# Patient Record
Sex: FEMALE | Race: BLACK OR AFRICAN AMERICAN | Employment: FULL TIME | URBAN - METROPOLITAN AREA
[De-identification: names, ages, dates, MRNs, and addresses within clinical notes are randomized per-mention and may not be internally consistent; named-entity substitution may affect disease eponyms.]

---

## 2017-04-11 ENCOUNTER — OFFICE VISIT (OUTPATIENT)
Dept: ORTHOPEDIC SURGERY | Age: 52
End: 2017-04-11

## 2017-04-11 VITALS
DIASTOLIC BLOOD PRESSURE: 87 MMHG | SYSTOLIC BLOOD PRESSURE: 149 MMHG | HEART RATE: 70 BPM | TEMPERATURE: 98 F | WEIGHT: 170 LBS | BODY MASS INDEX: 31.09 KG/M2

## 2017-04-11 DIAGNOSIS — M22.2X1 PATELLOFEMORAL PAIN SYNDROME OF BOTH KNEES: Primary | ICD-10-CM

## 2017-04-11 DIAGNOSIS — M25.561 RIGHT KNEE PAIN, UNSPECIFIED CHRONICITY: ICD-10-CM

## 2017-04-11 DIAGNOSIS — M25.562 LEFT KNEE PAIN, UNSPECIFIED CHRONICITY: ICD-10-CM

## 2017-04-11 DIAGNOSIS — M22.2X2 PATELLOFEMORAL PAIN SYNDROME OF BOTH KNEES: Primary | ICD-10-CM

## 2017-04-11 RX ORDER — MELOXICAM 15 MG/1
15 TABLET ORAL
Qty: 30 TAB | Refills: 0 | Status: SHIPPED | OUTPATIENT
Start: 2017-04-11 | End: 2018-09-10

## 2017-04-11 NOTE — PROGRESS NOTES
Ana Lewis  1965   Chief Complaint   Patient presents with    Knee Pain     [place]        HISTORY OF PRESENT ILLNESS  Ana Lewis is a 49 y.o. female who presents today for evaluation of B/L knee pain. She rates her pain 8/10 today. She describes a popping sensation when walking. She recalls she fell in the shower about a year ago and has been having increasing pain since then. Allergies   Allergen Reactions    Codeine Unknown (comments)        Past Medical History:   Diagnosis Date    Hypertension     Low back pain     Other ill-defined conditions     Sleep Apnea      Social History     Social History    Marital status:      Spouse name: N/A    Number of children: N/A    Years of education: N/A     Occupational History    Not on file. Social History Main Topics    Smoking status: Never Smoker    Smokeless tobacco: Not on file    Alcohol use No    Drug use: No    Sexual activity: Not on file     Other Topics Concern    Not on file     Social History Narrative      Past Surgical History:   Procedure Laterality Date    HX GYN      HX HYSTERECTOMY      HX MYOMECTOMY        Family History   Problem Relation Age of Onset    Hypertension Mother     Hypertension Father     Hypertension Brother     Diabetes Brother       Current Outpatient Prescriptions   Medication Sig    PROAIR HFA 90 mcg/actuation inhaler     diclofenac EC (VOLTAREN) 75 mg EC tablet     NIFEdipine ER (ADALAT CC) 30 mg ER tablet     omeprazole (PRILOSEC) 20 mg capsule     traMADol (ULTRAM) 50 mg tablet Take 50 mg by mouth every six (6) hours as needed for Pain. Patient states causes itching    olmesartan (BENICAR) 40 mg tablet Take  by mouth daily.  HYDROcodone-acetaminophen (NORCO) 5-325 mg per tablet     zolpidem (AMBIEN) 5 mg tablet     ibuprofen (MOTRIN) 600 mg tablet Take 1 Tab by mouth every six (6) hours as needed for Pain.     methylPREDNISolone (MEDROL DOSEPACK) 4 mg tablet Per dose pack instructions    COMPOUNDMAX BASE crea 240 g by Does Not Apply route four (4) times daily. Anti-Inflam Meloxicam 0.09% Topirmate 1% Methocarbamol 2%  Lidocaine 2% Prilocaine 2%     No current facility-administered medications for this visit. REVIEW OF SYSTEM   Patient denies: Weight loss, Fever/Chills, HA, Visual changes, Fatigue, Chest pain, SOB, Abdominal pain, N/V/D/C, Blood in stool or urine, Edema. Pertinent positive as above in HPI. All others were negative    PHYSICAL EXAM:   Visit Vitals    /87 (BP 1 Location: Left arm, BP Patient Position: Sitting)    Pulse 70    Temp 98 °F (36.7 °C) (Oral)    Wt 170 lb (77.1 kg)    BMI 31.09 kg/m2     The patient is a well-developed, well-nourished female   in no acute distress. The patient is alert and oriented times three. The patient is alert and oriented times three. Mood and affect are normal.  LYMPHATIC: lymph nodes are not enlarged and are within normal limits  SKIN: normal in color and non tender to palpation. There are no bruises or abrasions noted. NEUROLOGICAL: Motor sensory exam is within normal limits. Reflexes are equal bilaterally. There is normal sensation to pinprick and light touch  MUSCULOSKELETAL:  Examination Left knee Right knee   Skin Intact Intact   Range of motion 0-130 0-130   Effusion - -   Medial joint line tenderness - -   Lateral joint line tenderness - -   Tenderness Pes Bursa - -   Tenderness insertion MCL - -   Tenderness insertion LCL - -   Kavitas - -   Patella crepitus + +   Patella grind + +   Lachman - -   Pivot shift - -   Anterior drawer - -   Posterior drawer - -   Varus stress - -   Valgus stress - -   Neurovascular Intact Intact   Calf Swelling and Tenderness to Palpation - -   Estefanía's Test - -   Hamstring Cord Tightness - -        IMAGING: XR of the B/L knees dated 4/11/17 was reviewed and read: moderate degenerative changes in te patellofemoral joint B/L.       IMPRESSION:      ICD-10-CM ICD-9-CM    1. Patellofemoral pain syndrome of both knees M22.2X1 719.46 meloxicam (MOBIC) 15 mg tablet    M22.2X2     2. Right knee pain, unspecified chronicity M25.561 719.46 AMB POC XRAY, KNEE; 1/2 VIEWS      REFERRAL TO PHYSICAL THERAPY   3. Left knee pain, unspecified chronicity M25.562 719.46 AMB POC XRAY, KNEE; 1/2 VIEWS        PLAN: The patient has been having recurrent pain and catching in the B/L knees for about a year. I will refer her for a short course of physical therapy. She will also be given a prescription for Mobic. I will see her back in 4 weeks for reevaluation. Follow-up Disposition: Not on File    Scribed by Val Bass [facility]) as dictated by Yanet Hagan MD    I, Dr. Yanet Hagan, confirm that all documentation is accurate.     Yanet Hagan M.D.   [facility]

## 2017-04-17 ENCOUNTER — APPOINTMENT (OUTPATIENT)
Dept: PHYSICAL THERAPY | Age: 52
End: 2017-04-17

## 2018-09-10 ENCOUNTER — OFFICE VISIT (OUTPATIENT)
Dept: ORTHOPEDIC SURGERY | Age: 53
End: 2018-09-10

## 2018-09-10 VITALS
OXYGEN SATURATION: 99 % | WEIGHT: 174 LBS | TEMPERATURE: 97.6 F | SYSTOLIC BLOOD PRESSURE: 156 MMHG | HEART RATE: 77 BPM | DIASTOLIC BLOOD PRESSURE: 104 MMHG | BODY MASS INDEX: 31.83 KG/M2

## 2018-09-10 DIAGNOSIS — M47.812 SPONDYLOSIS OF CERVICAL REGION WITHOUT MYELOPATHY OR RADICULOPATHY: Primary | ICD-10-CM

## 2018-09-10 DIAGNOSIS — R20.0 NUMBNESS OF RIGHT HAND: ICD-10-CM

## 2018-09-10 DIAGNOSIS — M51.26 PROTRUSION OF LUMBAR INTERVERTEBRAL DISC: ICD-10-CM

## 2018-09-10 RX ORDER — TOPIRAMATE 25 MG/1
TABLET ORAL
Qty: 30 TAB | Refills: 1 | Status: SHIPPED | OUTPATIENT
Start: 2018-09-10 | End: 2022-09-07 | Stop reason: SINTOL

## 2018-09-10 NOTE — PATIENT INSTRUCTIONS
Learning About Degenerative Disc Disease  What is degenerative disc disease? Degenerative disc disease is not really a disease. It's a term used to describe the normal changes in your spinal discs as you age. Spinal discs are small, spongy discs that separate the bones (vertebrae) that make up the spine. The discs act as shock absorbers for the spine, so it can flex, bend, and twist.  Degenerative disc disease can take place in one or more places along the spine. It most often occurs in the discs in the lower back and the neck. What causes it? As we age, our spinal discs break down, or degenerate. This breakdown causes the symptoms of degenerative disc disease in some people. When the discs break down, they can lose fluid and dry out, and their outer layers can have tiny cracks or tears. This leads to less padding and less space between the bones in the spine. The body reacts to this by making bony growths on the spine called bone spurs. These spurs can press on the spinal nerve roots or spinal cord. This can cause pain and can affect how well the nerves work. What are the symptoms? Many people with degenerative disc disease have no pain. But others have severe pain or other symptoms that limit their activities. Some of the most common symptoms are:  · Pain in the back or neck. Where the pain occurs depends on which discs are affected. · Pain that gets worse when you move, such as bending over, reaching up, or twisting. · Pain that may occur in the rear end (buttocks), arm, or leg if a nerve is pinched. · Numbness or tingling in your arm or leg. How is it diagnosed? A doctor can often diagnose degenerative disc disease while doing a physical exam. If your exam shows no signs of a serious condition, imaging tests (such as an X-ray) aren't likely to help your doctor find the cause of your symptoms.   Sometimes degenerative disc disease is found when an X-ray is taken for another reason, such as an injury or other health problem. But even if the doctor finds degenerative disc disease, that doesn't always mean that you will have symptoms. How is it treated? There are several things you can do at home to manage pain from this problem. · To relieve pain, use ice or heat (whichever feels better) on the affected area. ¨ Put ice or a cold pack on the area for 10 to 20 minutes at a time. Put a thin cloth between the ice and your skin. ¨ Put a warm water bottle, a heating pad set on low, or a warm cloth on your back. Put a thin cloth between the heating pad and your skin. Do not go to sleep with a heating pad on your skin. · Ask your doctor if you can take acetaminophen (such as Tylenol) or nonsteroidal anti-inflammatory drugs, such as ibuprofen or naproxen. Your doctor can prescribe stronger medicines if needed. Be safe with medicines. Read and follow all instructions on the label. · Get some exercise every day. Exercise is one of the best ways to help your back feel better and stay better. It's best to start each exercise slowly. You may notice a little soreness, and that's okay. But if an exercise makes your pain worse, stop doing it. Here are things you can try:  ¨ Walking. It's the simplest and maybe the best activity for your back. It gets your blood moving and helps your muscles stay strong. ¨ Exercises that gently stretch and strengthen your stomach, back, and leg muscles. The stronger those muscles are, the better they're able to protect your back. If you have constant or severe pain in your back or spine, you may need other treatments, such as physical therapy. In some cases, your doctor may suggest surgery. Follow-up care is a key part of your treatment and safety. Be sure to make and go to all appointments, and call your doctor if you are having problems. It's also a good idea to know your test results and keep a list of the medicines you take. Where can you learn more?   Go to http://adriel-janelle.info/. Enter G964 in the search box to learn more about \"Learning About Degenerative Disc Disease. \"  Current as of: November 29, 2017  Content Version: 11.7  © 9582-9174 [employer]. Care instructions adapted under license by [employer] (which disclaims liability or warranty for this information). If you have questions about a medical condition or this instruction, always ask your healthcare professional. [place] [address] any warranty or liability for your use of this information.

## 2018-09-10 NOTE — PROGRESS NOTES
Verbal order entered per Dr. Shavon Sanchez as documented on blue sheet:  -Physical Therapy neck pain [facility]  -Topamax 25mg 1 tab po qhs disp#30 rf1

## 2018-09-10 NOTE — MR AVS SNAPSHOT
Saman Gregory 
 
 
 [address] [facility] [address] [facility] [address] 
[phone] Patient: Janae Gates MRN: [id number] :1965 Visit Information Date & Time Provider Department Dept. Phone Encounter #  
 9/10/2018  1:10 PM Francoise Chaney MD [facility], [address] [facility] [phone] [id number] Follow-up Instructions Return in about 6 weeks (around 10/22/2018). Upcoming Health Maintenance Date Due DTaP/Tdap/Td series (1 - Tdap) 1987 PAP AKA CERVICAL CYTOLOGY 1987 BREAST CANCER SCRN MAMMOGRAM 2016 FOBT Q 1 YEAR AGE 50-75 2016 Influenza Age 5 to Adult 2018 Allergies as of 9/10/2018  Review Complete On: 9/10/2018 By: Kelly Tavera Severity Noted Reaction Type Reactions Codeine    Unknown (comments) Current Immunizations  Never Reviewed No immunizations on file. Not reviewed this visit You Were Diagnosed With   
  
 Codes Comments Spondylosis of cervical region without myelopathy or radiculopathy    -  Primary ICD-10-CM: Y99.626 ICD-9-CM: 721.0 Numbness of right hand     ICD-10-CM: R20.0 ICD-9-CM: 782.0 Protrusion of lumbar intervertebral disc     ICD-10-CM: M51.26 
ICD-9-CM: 722.10 Vitals BP Pulse Temp Weight(growth percentile) SpO2 BMI  
 (!) 156/104 77 97.6 °F (36.4 °C) (Tympanic) 174 lb (78.9 kg) 99% 31.83 kg/m2 OB Status Smoking Status Hysterectomy Never Smoker BMI and BSA Data Body Mass Index Body Surface Area  
 31.83 kg/m 2 1.86 m 2 Preferred Pharmacy Pharmacy Name Phone [address] [facility], [address] Your Updated Medication List  
  
   
This list is accurate as of 9/10/18  2:17 PM.  Always use your most recent med list.  
  
  
  
  
 BENICAR 40 mg tablet Generic drug:  olmesartan Take  by mouth daily. ibuprofen 600 mg tablet Commonly known as:  MOTRIN Take 1 Tab by mouth every six (6) hours as needed for Pain. NIFEdipine ER 30 mg ER tablet Commonly known as:  ADALAT CC  
  
 omeprazole 20 mg capsule Commonly known as:  PRILOSEC PROAIR HFA 90 mcg/actuation inhaler Generic drug:  albuterol  
  
 topiramate 25 mg tablet Commonly known as:  TOPAMAX  
1 tab po qhs  
  
  
  
  
Prescriptions Sent to Pharmacy Refills  
 topiramate (TOPAMAX) 25 mg tablet 1 Si tab po qhs  
 Class: Normal  
 Pharmacy: [id number], [address] [facility] #: [phone] We Performed the Following AMB POC XRAY, SPINE, LUMBOSACRAL; 4+ [id number] CPT(R)] REFERRAL TO PHYSICAL THERAPY [UMV80 Custom] Follow-up Instructions Return in about 6 weeks (around 10/22/2018). Referral Information Referral ID Referred By Referred To  
  
 [id number] [address], [address] [place] [address] Phone: [phone] Fax: [phone] Visits Status Start Date End Date 1 New Request 9/10/18 9/10/19 If your referral has a status of pending review or denied, additional information will be sent to support the outcome of this decision. Patient Instructions Learning About Degenerative Disc Disease What is degenerative disc disease? Degenerative disc disease is not really a disease. It's a term used to describe the normal changes in your spinal discs as you age. Spinal discs are small, spongy discs that separate the bones (vertebrae) that make up the spine. The discs act as shock absorbers for the spine, so it can flex, bend, and twist. 
Degenerative disc disease can take place in one or more places along the spine. It most often occurs in the discs in the lower back and the neck. What causes it? As we age, our spinal discs break down, or degenerate.  This breakdown causes the symptoms of degenerative disc disease in some people. When the discs break down, they can lose fluid and dry out, and their outer layers can have tiny cracks or tears. This leads to less padding and less space between the bones in the spine. The body reacts to this by making bony growths on the spine called bone spurs. These spurs can press on the spinal nerve roots or spinal cord. This can cause pain and can affect how well the nerves work. What are the symptoms? Many people with degenerative disc disease have no pain. But others have severe pain or other symptoms that limit their activities. Some of the most common symptoms are: 
· Pain in the back or neck. Where the pain occurs depends on which discs are affected. · Pain that gets worse when you move, such as bending over, reaching up, or twisting. · Pain that may occur in the rear end (buttocks), arm, or leg if a nerve is pinched. · Numbness or tingling in your arm or leg. How is it diagnosed? A doctor can often diagnose degenerative disc disease while doing a physical exam. If your exam shows no signs of a serious condition, imaging tests (such as an X-ray) aren't likely to help your doctor find the cause of your symptoms. Sometimes degenerative disc disease is found when an X-ray is taken for another reason, such as an injury or other health problem. But even if the doctor finds degenerative disc disease, that doesn't always mean that you will have symptoms. How is it treated? There are several things you can do at home to manage pain from this problem. · To relieve pain, use ice or heat (whichever feels better) on the affected area. ¨ Put ice or a cold pack on the area for 10 to 20 minutes at a time. Put a thin cloth between the ice and your skin. ¨ Put a warm water bottle, a heating pad set on low, or a warm cloth on your back. Put a thin cloth between the heating pad and your skin.  Do not go to sleep with a heating pad on your skin. · Ask your doctor if you can take acetaminophen (such as Tylenol) or nonsteroidal anti-inflammatory drugs, such as ibuprofen or naproxen. Your doctor can prescribe stronger medicines if needed. Be safe with medicines. Read and follow all instructions on the label. · Get some exercise every day. Exercise is one of the best ways to help your back feel better and stay better. It's best to start each exercise slowly. You may notice a little soreness, and that's okay. But if an exercise makes your pain worse, stop doing it. Here are things you can try: ¨ Walking. It's the simplest and maybe the best activity for your back. It gets your blood moving and helps your muscles stay strong. ¨ Exercises that gently stretch and strengthen your stomach, back, and leg muscles. The stronger those muscles are, the better they're able to protect your back. If you have constant or severe pain in your back or spine, you may need other treatments, such as physical therapy. In some cases, your doctor may suggest surgery. Follow-up care is a key part of your treatment and safety. Be sure to make and go to all appointments, and call your doctor if you are having problems. It's also a good idea to know your test results and keep a list of the medicines you take. Where can you learn more? Go to http://adriel-janelle.info/. Enter H861 in the search box to learn more about \"Learning About Degenerative Disc Disease. \" Current as of: November 29, 2017 Content Version: 11.7 © 2757-6764 [employer]. Care instructions adapted under license by [employer] (which disclaims liability or warranty for this information). If you have questions about a medical condition or this instruction, always ask your healthcare professional. [place] [address] any warranty or liability for your use of this information. Introducing [facility]! [employer] introduces ???? patient portal. Now you can access parts of your medical record, email your doctor's office, and request medication refills online. 1. In your internet browser, go to [email or web address] 2. Click on the First Time User? Click Here link in the Sign In box. You will see the New Member Sign Up page. 3. Enter your ???? Access Code exactly as it appears below. You will not need to use this code after youve completed the sign-up process. If you do not sign up before the expiration date, you must request a new code. · ???? Access Code: [email or web address] Expires: 12/9/2018  2:17 PM 
 
4. Enter the last four digits of your Social Security Number (xxxx) and Date of Birth (mm/dd/yyyy) as indicated and click Submit. You will be taken to the next sign-up page. 5. Create a ???? ID. This will be your ???? login ID and cannot be changed, so think of one that is secure and easy to remember. 6. Create a ???? password. You can change your password at any time. 7. Enter your Password Reset Question and Answer. This can be used at a later time if you forget your password. 8. Enter your e-mail address. You will receive e-mail notification when new information is available in [address] 9. Click Sign Up. You can now view and download portions of your medical record. 10. Click the Download Summary menu link to download a portable copy of your medical information. If you have questions, please visit the Frequently Asked Questions section of the ???? website. Remember, ???? is NOT to be used for urgent needs. For medical emergencies, dial 911. Now available from your iPhone and Android! Please provide this summary of care documentation to your next provider. Your primary care clinician is listed as Dave Gloria. If you have any questions after today's visit, please call [phone].
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
## 2020-10-29 ENCOUNTER — OFFICE VISIT (OUTPATIENT)
Dept: ORTHOPEDIC SURGERY | Age: 55
End: 2020-10-29
Payer: COMMERCIAL

## 2020-10-29 VITALS
DIASTOLIC BLOOD PRESSURE: 90 MMHG | BODY MASS INDEX: 31.28 KG/M2 | HEART RATE: 79 BPM | SYSTOLIC BLOOD PRESSURE: 168 MMHG | TEMPERATURE: 97.9 F | WEIGHT: 171 LBS | RESPIRATION RATE: 16 BRPM

## 2020-10-29 DIAGNOSIS — M54.2 NECK PAIN: Primary | ICD-10-CM

## 2020-10-29 DIAGNOSIS — M62.838 NECK MUSCLE SPASM: ICD-10-CM

## 2020-10-29 PROCEDURE — 99213 OFFICE O/P EST LOW 20 MIN: CPT | Performed by: NURSE PRACTITIONER

## 2020-10-29 RX ORDER — DICLOFENAC SODIUM 75 MG/1
75 TABLET, DELAYED RELEASE ORAL
Qty: 60 TAB | Refills: 1 | Status: SHIPPED | OUTPATIENT
Start: 2020-10-29 | End: 2022-09-07

## 2020-10-29 RX ORDER — METHOCARBAMOL 500 MG/1
500 TABLET, FILM COATED ORAL
Qty: 60 TAB | Refills: 1 | Status: SHIPPED | OUTPATIENT
Start: 2020-10-29 | End: 2022-09-07

## 2020-10-29 NOTE — PROGRESS NOTES
Yifan Benavidez presents today for   Chief Complaint   Patient presents with    Neck Pain     fu       Is someone accompanying this pt? NO    Is the patient using any DME equipment during OV? NO    Depression Screening:  3 most recent PHQ Screens 10/29/2020   Little interest or pleasure in doing things Not at all   Feeling down, depressed, irritable, or hopeless Not at all   Total Score PHQ 2 0         Coordination of Care:  1. Have you been to the ER, urgent care clinic since your last visit? NO  Hospitalized since your last visit? NO    2. Have you seen or consulted any other health care providers outside of the [address] [place] [address] since your last visit? NO Include any pap smears or colon screening.  NO    Last  Checked 10/29/2020

## 2020-10-29 NOTE — PROGRESS NOTES
[address]  [address], [address]  [place], [address] [place] [address] Street  Phone: [phone]  Fax: [phone]    Kitty Razo  : 1965  PCP: Mireya Kendrick MD    PROGRESS NOTE    HISTORY OF PRESENT ILLNESS:  Chief Complaint   Patient presents with    Neck Pain     fu     Frankey Putnam is a 48 y.o.  female with history of neck and back pain. She last saw Dr. Christina Sebastian  2 years ago. She was referred to C PT and a C MRI was discussed after PT. She was given a trial if Topamax and was to continue tylenol. Today, she states she is having cervical pain and head pain and pain in her bilateral shoulders she is describing as heavy. She is a headache daily. She sleeps with a rolled pillow under her neck. She has seen a massage therapist. Her back continues to hurt intermittently but her neck is really bothering her the most.  She has no arm pain. She has taken motrin. She has been taking 4 Tylenol and 4 Advil four times a day. Denies bladder/bowel dysfunction, saddle paresthesia, weakness, gait disturbance, or other neurological deficit. Pt at this time desires to  continue with current care/proceed with medication evaluation. Location of pain: low back, neck  Does pain radiate into extremities: LLE sciatica intermittently  Does patient have weakness: Pt denies dropping things. Pt denies saddle paresthesias. Medications pt is on: Ibuprofen 600mg PRN some relief. Pt denies recent ED visits or hospitalizations. Denies persistent fevers, chills, weight changes, neurogenic bowel or bladder symptoms.      Treatments patient has tried:  Physical therapy:Yes, low back  Non-opioid medications: Yes  Spinal injections: No  Spinal surgery- No.      PMHx of gastric ulcer. 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 XRAY Cervical spine 8/3/2018  Result Impression   Impression: Mild reversal of cervical curvature and spondylolisthesis as described.          XRAY lumbar spine  5/2016  Result Impression   Impression: Degenerative change right sacroiliac joint.      2018  Lumbar spine xray films reviewed:  1) normal alignment  2) degenerative changes at L5-S1  3) no instability           MRI lumbar spine 2/2015  Result Impression   Impression:    1.  Small nonimpinging left posterior disc protrusion L4/5.    2.  Circumferential epidural fat with mild canal stenosis L5/S1. 3.  No acute osseous findings. ASSESSMENT  48 y.o. female with neck and back pain. Diagnoses and all orders for this visit:    1. Neck pain  -     REFERRAL TO PHYSICAL THERAPY    2. Neck muscle spasm  -     REFERRAL TO PHYSICAL THERAPY    Other orders  -     diclofenac EC (VOLTAREN) 75 mg EC tablet; Take 1 Tab by mouth two (2) times daily as needed for Pain. With food  -     methocarbamoL (Robaxin) 500 mg tablet; Take 1 Tab by mouth three (3) times daily as needed for Muscle Spasm(s). Indications: muscle spasm         IMPRESSION/PLAN    1) Pt was given information on neck exercises. 2) referral to PT, eval for dry needling  3) diclofenac, robaxin  4) can consider C MRI after conservative treatment  5) Ms. Anthony Warner has a reminder for a \"due or due soon\" health maintenance. I have asked that she contact her primary care provider, Angeles Block MD, for follow-up on this health maintenance. 6) We have informed patient to notify us for immediate appointment if he has any worsening neurogical symptoms or if an emergency situation presents, then call 911  7) Pt will follow-up in 8 weeks for conservative treatment FU. Risks and benefits of ongoing therapy have been reviewed with the patient.  is appropriate. No pain behaviors. Denies thoughts of harming self or others. Pt has a good risk to benefit ratio which allows the pt to function in a home environment without side effects.          PAST MEDICAL HISTORY  Past Medical History:   Diagnosis Date    Gastric ulcer     approx 2013    Hypertension     Low back pain     Other ill-defined conditions(799.89)     Sleep Apnea        MEDICATIONS  Current Outpatient Medications   Medication Sig Dispense Refill    diclofenac EC (VOLTAREN) 75 mg EC tablet Take 1 Tab by mouth two (2) times daily as needed for Pain. With food 60 Tab 1    methocarbamoL (Robaxin) 500 mg tablet Take 1 Tab by mouth three (3) times daily as needed for Muscle Spasm(s). Indications: muscle spasm 60 Tab 1    olmesartan (BENICAR) 40 mg tablet Take  by mouth daily.         topiramate (TOPAMAX) 25 mg tablet 1 tab po qhs 30 Tab 1    PROAIR HFA 90 mcg/actuation inhaler   0    NIFEdipine ER (ADALAT CC) 30 mg ER tablet   0    omeprazole (PRILOSEC) 20 mg capsule   0       ALLERGIES  Allergies   Allergen Reactions    Codeine Unknown (comments)       SOCIAL HISTORY    Social History     Socioeconomic History    Marital status:      Spouse name: Not on file    Number of children: Not on file    Years of education: Not on file    Highest education level: Not on file   Occupational History    Not on file   Social Needs    Financial resource strain: Not on file    Food insecurity     Worry: Not on file     Inability: Not on file    Transportation needs     Medical: Not on file     Non-medical: Not on file   Tobacco Use    Smoking status: Never Smoker    Smokeless tobacco: Never Used   Substance and Sexual Activity    Alcohol use: No    Drug use: No    Sexual activity: Not on file   Lifestyle    Physical activity     Days per week: Not on file     Minutes per session: Not on file    Stress: Not on file   Relationships    Social connections     Talks on phone: Not on file     Gets together: Not on file     Attends Congregational service: Not on file     Active member of club or organization: Not on file     Attends meetings of clubs or organizations: Not on file     Relationship status: Not on file    Intimate partner violence     Fear of current or ex partner: Not on file     Emotionally abused: Not on file     Physically abused: Not on file     Forced sexual activity: Not on file   Other Topics Concern    Not on file   Social History Narrative    Not on file       SUBJECTIVE      Pain Scale: 8/10    Pain Assessment  10/29/2020   Location of Pain Neck   Location Modifiers -   Severity of Pain 8   Quality of Pain (No Data)   Quality of Pain Comment pressure   Duration of Pain Persistent   Frequency of Pain Constant   Aggravating Factors -   Aggravating Factors Comment -   Limiting Behavior Some   Relieving Factors (No Data)   Relieving Factors Comment massage while doing       Accompanied by self. REVIEW OF SYSTEMS  ROS    Constitutional: Negative for fever, chills, or weight change. Respiratory: Negative for cough or shortness of breath. Cardiovascular: Negative for chest pain or palpitations. Gastrointestinal: Negative for acid reflux, change in bowel habits, or constipation. Genitourinary: Negative for incontinence, dysuria and flank pain. Musculoskeletal: Positive for neck and back pain. Skin: Negative for rash. Neurological: Negative for headaches, dizziness, or numbness. Endo/Heme/Allergies: Negative . Psychiatric/Behavioral: Negative. PHYSICAL EXAMINATION  Visit Vitals  BP (!) 168/90   Pulse 79   Temp 97.9 °F (36.6 °C) (Temporal)   Resp 16   Wt 171 lb (77.6 kg)   BMI 31.28 kg/m²       Constitutional: Well developed,  well nourished,  awake, alert, and in no acute distress. Neurological:  Sensation to light touch is intact. Psychiatric: Affect and mood are appropriate. Integumentary: No rashes or abrasions noted on exposed areas,  warm, dry and intact. Cardiovascular/Peripheral Vascular:  No peripheral edema is noted. Lymphatic:  No evidence of lymphedema. No cervical lymphadenopathy. SPINE/MUSCULOSKELETAL EXAM    Cervical spine:  Neck is midline. Normal muscle tone. No focal atrophy is noted.   Tenderness to palpation B/L occipital notch, B/L upper trapezii, cervical paraspinals. Negative Spurling's sign. Negative Tinel's sign. Negative Lilly's sign.      Sensation grossly intact to light touch.      Lumbar spine:  No rash, ecchymosis, or gross obliquity. No fasciculations. No focal atrophy is noted. Tenderness to palpation L4-5. No tenderness to palpation at the sciatic notch. SI joints non-tender. Trochanters non tender. Sensation grossly intact to light touch.        MOTOR:       Biceps  Triceps Deltoids Wrist Ext Wrist Flex Hand Intrin   Right +4/5 +4/5 +4/5 +4/5 +4/5 4/5   Left +4/5 +4/5 +4/5 +4/5 +4/5 4/5           Hip Flex  Quads Hamstrings Ankle DF EHL Ankle PF   Right +4/5 +4/5 +4/5 +4/5 +4/5 +4/5   Left +4/5 +4/5 +4/5 +4/5 +4/5 +4/5      Straight Leg raise negative bilaterally. Ambulation without assistive device. FWB.       Mounika Horan NP 82

## 2020-11-05 ENCOUNTER — HOSPITAL ENCOUNTER (OUTPATIENT)
Dept: PHYSICAL THERAPY | Age: 55
Discharge: HOME | End: 2020-11-05
Payer: COMMERCIAL

## 2020-11-05 PROCEDURE — 97162 PT EVAL MOD COMPLEX 30 MIN: CPT

## 2020-11-05 PROCEDURE — 97140 MANUAL THERAPY 1/> REGIONS: CPT

## 2020-11-05 PROCEDURE — 97110 THERAPEUTIC EXERCISES: CPT

## 2020-11-05 PROCEDURE — 97535 SELF CARE MNGMENT TRAINING: CPT

## 2020-11-05 NOTE — PROGRESS NOTES
[facility]  [address]  [place], [address]  [phone] fax    Plan of Care/ Statement of Necessity for Physical Therapy Services    Patient name: Petros Jiménez Start of Care: 2020   Referral source: Kvaya Tavera NP : 1965    Medical Diagnosis: Cervicalgia [M54.2]  Payor: [facility] / Plan: [facility] PPO / Product Type: PPO /  Onset Date:2020    Treatment Diagnosis: neck pain   Prior Hospitalization: see medical history Provider#: [id number]   Medications: Verified on Patient summary List    Comorbidities: back pain, headaches, high blood pressure   Prior Level of Function: manages ADLs without neck pain     The Plan of Care and following information is based on the information from the initial evaluation. Assessment/ key information: Pt is a 60-year-old female with c/o neck and B shoulder pain with cervicogenic headaches beginning 2 months ago, progressively worsening. Pain begins in the suboccipital region, into the cervical paraspinals, scalenes, levator scaps, and B upper traps. Denies any neurological symptoms or UE pain. Pain increases with cervical ROM in all directions. Pain described as throbbing. Pt notes one recent experience with a deep tissue massage to her neck in which she experienced vertigo for 9 days following with 1 fall. 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 Current impairments include reduced cervical ROM, decreased flexibility, decreased ROM, decreased strength, daily headaches, and decreased ease of  ADLs and self care. Increased myofascial restrictions and muscle tensions noted to the B scalenes and upper traps (left > right). Pt would benefit from dry needling.     Patient will benefit from skilled PT services to modify and progress therapeutic interventions, address functional mobility deficits, address ROM deficits, address strength deficits, analyze and address soft tissue restrictions, analyze and cue movement patterns, analyze and modify body mechanics/ergonomics, assess and modify postural abnormalities, address imbalance/dizziness and instruct in home and community integration to attain remaining goals. Evaluation Complexity History MEDIUM  Complexity : 1-2 comorbidities / personal factors will impact the outcome/ POC ; Examination MEDIUM Complexity : 3 Standardized tests and measures addressing body structure, function, activity limitation and / or participation in recreation  ;Presentation MEDIUM Complexity : Evolving with changing characteristics  ; Clinical Decision Making MEDIUM Complexity : FOTO score of 26-74  Overall Complexity Rating: MEDIUM  Problem List: pain affecting function, decrease ROM, decrease strength, decrease ADL/ functional abilitiies, decrease activity tolerance and decrease flexibility/ joint mobility   Treatment Plan may include any combination of the following: Therapeutic exercise, Therapeutic activities, Neuromuscular re-education, Physical agent/modality, Manual therapy, Patient education, Self Care training and Functional mobility training  Patient / Family readiness to learn indicated by: asking questions, trying to perform skills and interest  Persons(s) to be included in education: patient (P)  Barriers to Learning/Limitations: None  Patient Goal (s): relieve pain  Patient Self Reported Health Status: good  Rehabilitation Potential: good    Short Term Goals: To be accomplished in 2 weeks:  1. Pt will report daily compliance with HEP to maximize therapeutic success. 2. Pt will report decreased pain post-therapy, indicating good prognosis for therapeutic carryover. Long Term Goals: To be accomplished in 4 weeks:  1. Pt will improve FOTO to 52, demonstrating improved functional capacity for ADLs.   2. Pt will improve cervical flexion and extension ROM to 40 degrees or better to improve ease of self care. 3. Pt will improve cervical lateral flexion B to 40 degrees to improve ease of self care. 4. Pt will improve B cervical rotation rotation to 45 degrees or better with minimal to no pain to improve ease of driving. 5. Pt will improve scapular strength MMT to 4+/5 or better to improve ease of ADLs. 6. Pt will report 75% improvement in symptoms with minimal to no headaches to improve QOL. Frequency / Duration: Patient to be seen 2 times per week for 4 weeks. Patient/ Caregiver education and instruction: Diagnosis, prognosis, self care, activity modification and exercises   [x]  Plan of care has been reviewed with ASA Graff, PT 11/5/2020 9:04 AM    ________________________________________________________________________    I certify that the above Therapy Services are being furnished while the patient is under my care. I agree with the treatment plan and certify that this therapy is necessary.     Physician's Signature:____________Date:_________TIME:________    ** Signature, Date and Time must be completed for valid certification **    Please sign and return to [facility] [address]  [address] [facility] [address]  [place], [address]  [phone] fax

## 2020-11-05 NOTE — PROGRESS NOTES
PT DAILY TREATMENT NOTE 11    Patient Name: Shannan Lafleur  Date:2020  : 1965  [x]  Patient  Verified  Payor: BLUE CROSS / Plan: [facility] PPO / Product Type: PPO /    In time:745  Out time:843  Total Treatment Time (min): 62  Visit #: 1 of 8    Medicare/BCBS Only   Total Timed Codes (min):  18 1:1 Treatment Time:  48       Treatment Area: Cervicalgia [M54.2]    SUBJECTIVE  Pain Level (0-10 scale): 10  Any medication changes, allergies to medications, adverse drug reactions, diagnosis change, or new procedure performed?: [x] No    [] Yes (see summary sheet for update)  Subjective functional status/changes:   [] No changes reported  See POC    OBJECTIVE     18 min [x]Eval                  []Re-Eval     15 min Therapeutic Exercise:  [] See flow sheet : HEP education and performance   Rationale: increase ROM and increase strength to improve the patients ability to perform ADLs. 15 min Self Care/Home management:  []  See flow sheet : Discussion of relevant anatomy and pathology as it relates to self care. Discussed techniques for upper trap relaxation throughout the day. Educated pt regarding process for dry needling intervention and importance of daily HEP compliance. Rationale: decrease pain  to improve the patients ability to improve ease of self care. 10 min Manual Therapy:  Pt supine with LE wedge -- SOR, STM to cervical paraspinals, scalenes, levator scaps, upper traps; GR2 PA oscillations to c/s   The manual therapy interventions were performed at a separate and distinct time from the therapeutic activities interventions. Rationale: decrease pain, increase ROM and increase tissue extensibility to improve ease of ADLs.         With   [] TE   [] TA   [] neuro   [] other: Patient Education: [x] Review HEP    [] Progressed/Changed HEP based on:   [] positioning   [] body mechanics   [] transfers   [] heat/ice application    [] other:      Other Objective/Functional Measures: see POC     Pain Level (0-10 scale) post treatment: 8    ASSESSMENT/Changes in Function: Pt is a 60-year-old female with c/o neck and B shoulder pain with cervicogenic headaches beginning 2 months ago, progressively worsening. Pain begins in the suboccipital region, into the cervical paraspinals, scalenes, levator scaps, and B upper traps. Denies any neurological symptoms or UE pain. Pain increases with cervical ROM in all directions. Pain described as throbbing. Pt notes one recent experience with a deep tissue massage to her neck in which she experienced vertigo for 9 days following with 1 fall. 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 Current impairments include reduced cervical ROM, decreased flexibility, decreased ROM, decreased strength, daily headaches, and decreased ease of  ADLs and self care. Increased myofascial restrictions and muscle tensions noted to the B scalenes and upper traps (left > right). Pt would benefit from dry needling. Patient will benefit from skilled PT services to modify and progress therapeutic interventions, address functional mobility deficits, address ROM deficits, address strength deficits, analyze and address soft tissue restrictions, analyze and cue movement patterns, analyze and modify body mechanics/ergonomics, assess and modify postural abnormalities, address imbalance/dizziness and instruct in home and community integration to attain remaining goals. [x]  See Plan of Care  []  See progress note/recertification  []  See Discharge Summary         Progress towards goals / Updated goals:  Short Term Goals: To be accomplished in 2 weeks:  1. Pt will report daily compliance with HEP to maximize therapeutic success. Eval: HEP assigned  2. Pt will report decreased pain post-therapy, indicating good prognosis for therapeutic carryover. Eval: decreased pain post-manual  Long Term Goals:  To be accomplished in 4 weeks:  1. Pt will improve FOTO to 52, demonstrating improved functional capacity for ADLs. Eval: 28  2. Pt will improve cervical flexion and extension ROM to 40 degrees or better to improve ease of self care. Eval: 15 degrees flexion with pain, 20 degrees ext with pain  3. Pt will improve cervical lateral flexion B to 40 degrees to improve ease of self care. Eval: 30 degrees B with pain  4. Pt will improve B cervical rotation rotation to 45 degrees or better with minimal to no pain to improve ease of driving. Eval: 20 degrees to right, 35 degrees to left with pain B  5. Pt will improve scapular strength MMT to 4+/5 or better to improve ease of ADLs. Eval: grossly 4/5 B, 4-/5 ER B  6. Pt will report 75% improvement in symptoms with minimal to no headaches to improve QOL.     Eval: 0%, daily headaches    PLAN  []  Upgrade activities as tolerated     [x]  Continue plan of care  []  Update interventions per flow sheet       []  Discharge due to:_  []  Other:_      Leigh Ma, PT 11/5/2020  8:46 AM    Future Appointments   Date Time Provider Diane Gomez   12/30/2020  8:15 AM Cisco Avelar NP [facility] BS AMB

## 2020-11-20 NOTE — PROGRESS NOTES
[facility]  [address]  [place], [address]  [phone] fax    Physical Therapy Discharge Summary  Patient name: Maryanne Rashid Start of Care: 2020   Referral source: Tashi Albarran NP : 1965                Medical Diagnosis: Cervicalgia [M54.2]  Payor: BLUE [place] / Plan: [facility] PPO / Product Type: PPO /  Onset Date:2020                Treatment Diagnosis: neck pain   Prior Hospitalization: see medical history Provider#: [id number]   Medications: Verified on Patient summary List    Comorbidities: back pain, headaches, high blood pressure   Prior Level of Function: manages ADLs without neck pain    Visits from Start of Care: 1    Missed Visits: 3  Reporting Period : 2020 to 2020      Summary of Care:  Short Term Goals: To be accomplished in 2 weeks:  1. Pt will report daily compliance with HEP to maximize therapeutic success. Eval: HEP assigned  2. Pt will report decreased pain post-therapy, indicating good prognosis for therapeutic carryover. Eval: decreased pain post-manual  Long Term Goals: To be accomplished in 4 weeks:  1. Pt will improve FOTO to 52, demonstrating improved functional capacity for ADLs. Eval: 28  2. Pt will improve cervical flexion and extension ROM to 40 degrees or better to improve ease of self care. Eval: 15 degrees flexion with pain, 20 degrees ext with pain  3. Pt will improve cervical lateral flexion B to 40 degrees to improve ease of self care. Eval: 30 degrees B with pain  4. Pt will improve B cervical rotation rotation to 45 degrees or better with minimal to no pain to improve ease of driving. Eval: 20 degrees to right, 35 degrees to left with pain B  5. Pt will improve scapular strength MMT to 4+/5 or better to improve ease of ADLs. Eval: grossly 4/5 B, 4-/5 ER B  6.  Pt will report 75% improvement in symptoms with minimal to no headaches to improve QOL. Eval: 0%, daily headaches      ASSESSMENT/RECOMMENDATIONS: The pt has not attended any f/u sessions since evaluation. She reports currently quarantined secondary to COVID close contact. Pt also reports BP issues and workup ongoing.  Decided to D/C from PT at this time    [x]Discontinue therapy:       [x]Patient is non-compliant or has abdicated          Harmony Leonard DPT ,CMTPT 11/20/2020 11:33 AM

## 2020-11-23 ENCOUNTER — APPOINTMENT (OUTPATIENT)
Dept: PHYSICAL THERAPY | Age: 55
End: 2020-11-23
Payer: COMMERCIAL

## 2020-11-25 ENCOUNTER — APPOINTMENT (OUTPATIENT)
Dept: PHYSICAL THERAPY | Age: 55
End: 2020-11-25
Payer: COMMERCIAL

## 2020-11-30 ENCOUNTER — APPOINTMENT (OUTPATIENT)
Dept: PHYSICAL THERAPY | Age: 55
End: 2020-11-30
Payer: COMMERCIAL

## 2020-12-02 ENCOUNTER — APPOINTMENT (OUTPATIENT)
Dept: PHYSICAL THERAPY | Age: 55
End: 2020-12-02

## 2022-03-19 PROBLEM — R20.0 NUMBNESS OF RIGHT HAND: Status: ACTIVE | Noted: 2018-09-10

## 2022-03-20 PROBLEM — M47.812 SPONDYLOSIS OF CERVICAL REGION WITHOUT MYELOPATHY OR RADICULOPATHY: Status: ACTIVE | Noted: 2018-09-10

## 2022-09-07 ENCOUNTER — OFFICE VISIT (OUTPATIENT)
Dept: ORTHOPEDIC SURGERY | Age: 57
End: 2022-09-07
Payer: COMMERCIAL

## 2022-09-07 VITALS
WEIGHT: 166 LBS | TEMPERATURE: 97.5 F | RESPIRATION RATE: 18 BRPM | HEIGHT: 62 IN | OXYGEN SATURATION: 100 % | BODY MASS INDEX: 30.55 KG/M2 | HEART RATE: 78 BPM

## 2022-09-07 DIAGNOSIS — M54.10 BRACHIAL NEURITIS OF LEFT UPPER EXTREMITY: Primary | ICD-10-CM

## 2022-09-07 DIAGNOSIS — M89.9 LESION OF BONE OF THORACIC SPINE: Primary | ICD-10-CM

## 2022-09-07 DIAGNOSIS — M54.10 BRACHIAL NEURITIS OF LEFT UPPER EXTREMITY: ICD-10-CM

## 2022-09-07 DIAGNOSIS — G89.29 CHRONIC BILATERAL LOW BACK PAIN WITHOUT SCIATICA: ICD-10-CM

## 2022-09-07 DIAGNOSIS — M54.2 NECK PAIN: ICD-10-CM

## 2022-09-07 DIAGNOSIS — M54.50 CHRONIC BILATERAL LOW BACK PAIN WITHOUT SCIATICA: ICD-10-CM

## 2022-09-07 PROCEDURE — 72110 X-RAY EXAM L-2 SPINE 4/>VWS: CPT | Performed by: PHYSICAL MEDICINE & REHABILITATION

## 2022-09-07 PROCEDURE — 72040 X-RAY EXAM NECK SPINE 2-3 VW: CPT | Performed by: PHYSICAL MEDICINE & REHABILITATION

## 2022-09-07 PROCEDURE — 99214 OFFICE O/P EST MOD 30 MIN: CPT | Performed by: PHYSICAL MEDICINE & REHABILITATION

## 2022-09-07 RX ORDER — OLMESARTAN MEDOXOMIL AND HYDROCHLOROTHIAZIDE 40/25 40; 25 MG/1; MG/1
1 TABLET ORAL DAILY
COMMUNITY
Start: 2022-08-18

## 2022-09-07 RX ORDER — AMLODIPINE BESYLATE 5 MG/1
5 TABLET ORAL DAILY
COMMUNITY
Start: 2022-08-18

## 2022-09-07 RX ORDER — PREGABALIN 50 MG/1
50 CAPSULE ORAL 2 TIMES DAILY
Qty: 60 CAPSULE | Refills: 1 | Status: SHIPPED | OUTPATIENT
Start: 2022-09-07 | End: 2022-10-19 | Stop reason: SDUPTHER

## 2022-09-07 NOTE — PROGRESS NOTES
Omer Marvin presents today for   Chief Complaint   Patient presents with    Neck Pain       Is someone accompanying this pt? no    Is the patient using any DME equipment during OV? no    Depression Screening:  3 most recent PHQ Screens 10/29/2020   Little interest or pleasure in doing things Not at all   Feeling down, depressed, irritable, or hopeless Not at all   Total Score PHQ 2 0       Learning Assessment:  No flowsheet data found. Abuse Screening:  No flowsheet data found. Fall Risk  No flowsheet data found. OPIOID RISK TOOL  No flowsheet data found. Coordination of Care:  1. Have you been to the ER, urgent care clinic since your last visit? no  Hospitalized since your last visit? no    2. Have you seen or consulted any other health care providers outside of the [address] [place] [address] since your last visit? no Include any pap smears or colon screening.  no

## 2022-09-07 NOTE — PROGRESS NOTES
[address]  [address], [address]  [place], [address] [place] [address] Street  Phone: [phone]  Fax: [phone]        Capri Mabry  : 1965  PCP: Sheron Nelson MD    PROGRESS NOTE      ASSESSMENT AND PLAN    Diagnoses and all orders for this visit:    1. Brachial neuritis of left upper extremity  -     MRI CERV SPINE WO CONT; Future  -     pregabalin (Lyrica) 50 mg capsule; Take 1 Capsule by mouth two (2) times a day. Max Daily Amount: 100 mg. Month 1: one po qhs x 1 week, then increase to one po bid thereafter    2. Neck pain  -     AMB POC XRAY, SPINE, CERVICAL; 2 OR 3  -     MRI CERV SPINE WO CONT; Future    3. Chronic bilateral low back pain without sciatica  -     AMB POC XRAY, SPINE, LUMBOSACRAL; 4+      Riya Navarro is a 65 y.o. female with 2 months of persistent left upper extremity pain after COVID infection. Failed NSAIDs. .   C MRI for 2 months LUE pain and paresthesia post COVID, failed oral steroids/NSAIDs, currently in PT  Trial of Lyrica 50 mg. Take 1 po QHS x one week then increase to 1 po BID thereafter  Avoid overhead lifting or reaching. Follow-up and Dispositions    Return in about 4 weeks (around 10/5/2022) for MRI/CT fu. HISTORY OF PRESENT ILLNESS      Riya Navarro is a 65 y.o. female presents for follow up of neck pain. LV 10/2020 with NP Mary, referred to PT, trial of Robaxin, trial of Voltaren 75 mg BID. She reports constant neck pain radiating into her LUE x 2 months. Her symptoms started after she getting COVID. Pt has numbness and tingling in her LUE to the fingertips. Denies hx of CTS. Pt has constant headaches. She denies chest pain/shortness of breath. She also complains of cramping in her calves and feet, worse at night. Pt has failed Voltaren, Baclofen, and Prednisone. Denies side effects. Pain Assessment  2022   Location of Pain Neck; Shoulder;Arm   Location Modifiers Left   Severity of Pain 8 Quality of Pain Dull;Aching; Sharp   Quality of Pain Comment -   Duration of Pain Persistent   Frequency of Pain Constant   Aggravating Factors Bending;Stretching;Straightening;Exercise;Kneeling;Squatting;Standing;Walking;Stairs; Other (Comment)   Aggravating Factors Comment first thing in the morning movement   Limiting Behavior Yes   Relieving Factors Heat   Relieving Factors Comment -   Result of Injury No         Investigations:   C XR AP/lat 2V 9/7/2022 (I personally reviewed these images): NSS, degenerative changes C4-5  L XR AP/lat/flex/ext 4V 9/7/2022 (I personally reviewed these images): early degenerative changes L4 to sacrum, no instability  L MRI 2015: small disc protrusion left L4-5, mild stenosis L5-S1  Spine surgery consult: unknown    Treatments:  Physical therapy: 2/2022 for neck, shoulder, and low back, 10/2020  Spinal injections: no  Spinal surgery- no  Beneficial medications: unknown  Failed medications: Voltaren, Baclofen, Prednisone    Work Status: employed at [employer]  Pertinent PMHx:  HTN, gastric ulcer, COVID 7/2022.       PHYSICAL EXAMINATION    Visit Vitals  Pulse 78   Temp 97.5 °F (36.4 °C) (Temporal)   Resp 18   Ht 5' 2\" (1.575 m)   Wt 166 lb (75.3 kg)   SpO2 100% Comment: RA   BMI 30.36 kg/m²       Left shoulder abduction limited to 90 degrees  Tender medial and lateral epicondyle left elbow  Pain with left wrist ROM  Negative Tinel's at wrist, positive left elbow  DTRs absent UE and LE  TTP L3-4 left greater than right  LE strength intact  SLR negative  RUE strength intact  LUE pain inhibited deltoids, triceps, biceps intact  Intrinsics intact                Written by Randa Martinez, as dictated by Dominic Roblero MD.

## 2022-09-07 NOTE — LETTER
9/8/2022    Patient: Ayala Mathias   YOB: 1965   Date of Visit: 9/7/2022     Stefani Bell MD  [facility] [address]  Via Fax: [phone]    Dear Stefani Bell MD,      Thank you for referring Ms. Olive Law to [address] [place] [address] for evaluation. My notes for this consultation are attached. If you have questions, please do not hesitate to call me. I look forward to following your patient along with you.       Sincerely,    Fernanda Vincent MD

## 2022-09-08 ENCOUNTER — HOSPITAL ENCOUNTER (OUTPATIENT)
Dept: PHYSICAL THERAPY | Age: 57
Discharge: HOME | End: 2022-09-08
Payer: COMMERCIAL

## 2022-09-08 PROCEDURE — 97162 PT EVAL MOD COMPLEX 30 MIN: CPT

## 2022-09-08 NOTE — PROGRESS NOTES
[facility]  [address] [facility] [address]  [place], [address]  [phone] fax    Plan of Care/ Statement of Necessity for Physical Therapy Services    Patient name: Katarina Shelby Start of Care: 2022   Referral source: Alvin Li MD : 1965    Medical Diagnosis: Cervical radicular pain [M54.12]  Payor: BLUE CROSS / Plan: [facility] [place] [place] / Product Type: PPO /  Onset Date:2022    Treatment Diagnosis: neck pain   Prior Hospitalization: see medical history Provider#: [id number]   Medications: Verified on Patient summary List    Comorbidities: independent with ADLs   Prior Level of Function: unable to comb hair with left arm or hold phone with left arm     The Plan of Care and following information is based on the information from the initial evaluation. Assessment/ key information: Pt is a 65 y.o. female who presents with c/o chronic neck pain ongoing for multiple years with a recent exacerbation after being diagnosed with Covid in 2022. Pt reports having increased numbness and tingling sensation down left arm. Functional deficits include: decreased activity tolerance with the left arm and pain with cervical AROM. Upon exam, Pt exhibited decreased cervical AROM secondary to pain, moderate tenderness to palpation over bilateral UT, LS, and SCM and decreased muscle strength. Pt would benefit from skilled PT to address above deficits to improve Pt's function and ability to return to ease of performing daily activities. Evaluation Complexity History MEDIUM  Complexity : 1-2 comorbidities / personal factors will impact the outcome/ POC ; Examination MEDIUM Complexity : 3 Standardized tests and measures addressing body structure, function, activity limitation and / or participation in recreation  ;Presentation MEDIUM Complexity : Evolving with changing characteristics  ; Clinical Decision Making MEDIUM Complexity : FOTO score of 26-74  Overall Complexity Rating: MEDIUM  Problem List: pain affecting function, decrease ROM, decrease strength, decrease ADL/ functional abilitiies, decrease activity tolerance, decrease flexibility/ joint mobility, and decrease transfer abilities   Treatment Plan may include any combination of the following: Therapeutic exercise, Therapeutic activities, Neuromuscular re-education, Physical agent/modality, Manual therapy, Patient education, and Self Care training  Patient / Family readiness to learn indicated by: asking questions, trying to perform skills, and interest  Persons(s) to be included in education: patient (P)  Barriers to Learning/Limitations: None  Patient Goal (s): I want to eliminate as much pain as possible and find out what it is  Patient Self Reported Health Status: good  Rehabilitation Potential: good    Short Term Goals: To be accomplished in 1 week  - Goal: Pt to be compliant with initial HEP to improve ease of performing household chores and duties  Status at last note/certification: Established and reviewed with Pt  Long Term Goals: To be accomplished in 10 treatments  - Goal: Pt to report < 5/10 pain at worst to increase ease with ADLs. Status at last note/certification: 27/74 pain at worst  - Goal: Pt to report at least 75 % improvement in overall symptoms to improve quality of life and ease of performing household chores. Status at last note/certification: N/A  - Goal: Pt to demonstrate 4/5 gross UE MMT to increase ease of maintaining scapular stability and movement. Status at last note/certification: Lower Trap: 3/5 bilateral              Rhomboids:3/5 bilateral              Middle Trap: 3/5 bilateral  - Goal: Pt to report FOTO score of at least 53 pts to demonstrate improved function and quality of life. Status at last note/certification: FOTO 32 pts     Frequency / Duration: Patient to be seen 2 times per week for 5 weeks.     Patient/ Caregiver education and instruction: Diagnosis, prognosis, self care, activity modification, and exercises   [x]  Plan of care has been reviewed with ASA German, PT 9/8/2022 11:04 AM    ________________________________________________________________________    I certify that the above Therapy Services are being furnished while the patient is under my care. I agree with the treatment plan and certify that this therapy is necessary.     [de-identified] Signature:____________Date:_________TIME:________     Madhuri Chaudhry MD  ** Signature, Date and Time must be completed for valid certification **    Please sign and return to [facility] [address] [place] [address] & [facility]  [address] [place] [address], [facility] [address]  [phone] fax

## 2022-09-08 NOTE — PROGRESS NOTES
PT DAILY TREATMENT NOTE/CERVICAL [id number]    Patient Name: Abdelrahman Slade  Date:2022  : 1965  [x]  Patient  Verified  Payor: BLUE CROSS / Plan: [facility] PPO / Product Type: PPO /    In time:10:22am  Out time:10:58am  Total Treatment Time (min): 36  Visit #: 1 of 10    Medicare/BCBS Only   Total Timed Codes (min):  11 1:1 Treatment Time:  36     Treatment Area: Cervical radicular pain [M54.12]    SUBJECTIVE  Pain Level (0-10 scale): current: 8; best: 6 (when awake); worst: 10/10  []constant []intermittent []improving []worsening []no change since onset    Any medication changes, allergies to medications, adverse drug reactions, diagnosis change, or new procedure performed?: [x] No    [] Yes (see summary sheet for update)  Subjective functional status/changes:     PLOF: Independent with ADLs  Limitations to PLOF: unable to comb hair, noticed weakness when holding phone for a while it starts to tremble  Mechanism of Injury: Ongoing neck pain for years. Had Covid in  and I noticed it started to feel worse. Covid symptoms for two weeks and I have residual arm numbness and tingling since recovering from Covid. Right handed. 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 \"    OBJECTIVE/EXAMINATION  Mobility: independent  Self Care: independent      25 min [x]Eval                  []Re-Eval       11 min Therapeutic Exercise:  [] See flow sheet :   Rationale: increase ROM and increase strength to improve the patients ability to perform daily activities with ease        With   [x] TE   [] TA   [] neuro   [] other: Patient Education: [x] Review HEP    [] Progressed/Changed HEP based on:   [] positioning   [] body mechanics   [] transfers   [] heat/ice application    [] other:      Other Objective/Functional Measures:     Physical Therapy Evaluation Cervical Spine     SUBJECTIVE    Headaches: Do you have headaches? [x] Yes   [] No  How often do you get headaches? All the time  How long does the headache last? Roughly 1 hour  What aggravates it? What relieves it? Medication doesn't seem to work  Does the headache coincide with any other symptoms (visual disturbances, light sensitivity)? Where is the headache? Front of head  Does it change locations?   Other:    OBJECTIVE  Posture: [] WNL  Head Position: forward head  Shoulder/Scapular Position: rounded shoulders    Cervical Retraction: [x] WNL    [] Abnormal:    Active Movements: [] N/A   [] Too acute   [] Other:  ROM % AROM % PROM Comments:pain, area   Forward flexion 75     Extension 50     SB right 100  Pain/pulling   SB left  100     Rotation right 100     Rotation left        Palpation:  [] Min  [x] Mod  [] Severe    Location: UT, LS, insertion bilateral SCM, muscle belly left SCM  [] Min  [] Mod  [] Severe    Location:  [] Min  [] Mod  [] Severe    Location:    Neuro Screen (myotome/dematome/felexes): [] WNL  Myotome Level Muscle Test Myotome Level Muscle Test   C5 Shoulder Adduction - Deltoid C8 Finger Flexors   C6 Wrist Extension T1 Finger Abduction - Interossei   C7 Elbow Extension     Comments:  Upper Limb Tension Tests: [] N/A       Ulnar: [] R    [] L    [] +    [x] -       Median: [] R    [] L    [] +    [x] -       Radial: [] R    [] L    [] +    [x] -    Special Tests:  Cervical:        Vertebral Artery:  [] R    [] L    [] +    [] -       Alar Ligament: [] R    [] L    [] +    [] -       Transverse Lig: [] R    [] L    [] +    [] -       Spurling's:  [] R    [] L    [] +    [] - Distraction:  [] R    [] L    [] +    [x] -       Compression: [] R    [] L    [] +    [x] -      Muscle Flexibility: [] N/A   Scalenes: [] WNL    [] Tight    [] R    [] L   Upper Trap: [] WNL    [] Tight    [] R    [] L   Levator: [] WNL    [] Tight    [] R    [] L   Pect. Minor: [] WNL    [] Tight    [] R    [] L    Global Muscular Weakness: [] N/A   Lower Trap: 3/5 bilateral   Rhomboids:3/5 bilateral   Middle Trap: 3/5 bilateral     Other tests/comments:     Pain Level (0-10 scale) post treatment: 7 neck and shoulders    ASSESSMENT/Changes in Function: See POC    Patient will continue to benefit from skilled PT services to modify and progress therapeutic interventions, address functional mobility deficits, address ROM deficits, address strength deficits, analyze and address soft tissue restrictions, analyze and cue movement patterns, analyze and modify body mechanics/ergonomics, assess and modify postural abnormalities, address imbalance/dizziness, and instruct in home and community integration to attain remaining goals.      [x]  See Plan of Care  []  See progress note/recertification  []  See Discharge Summary         Progress towards goals / Updated goals:  See POC     PLAN  []  Upgrade activities as tolerated     [x]  Continue plan of care  []  Update interventions per flow sheet       []  Discharge due to:_  []  Other:_      Francine Radford, PT 9/8/2022  10:12 AM

## 2022-09-13 ENCOUNTER — APPOINTMENT (OUTPATIENT)
Dept: PHYSICAL THERAPY | Age: 57
End: 2022-09-13
Payer: COMMERCIAL

## 2022-09-16 ENCOUNTER — APPOINTMENT (OUTPATIENT)
Dept: PHYSICAL THERAPY | Age: 57
End: 2022-09-16
Payer: COMMERCIAL

## 2022-09-20 ENCOUNTER — TELEPHONE (OUTPATIENT)
Dept: PHYSICAL THERAPY | Age: 57
End: 2022-09-20

## 2022-09-20 NOTE — PROGRESS NOTES
[facility]  [address]  [place], [address]  [phone] fax    Physical Therapy Discharge Summary  Patient name: Matt Meneses Start of Care: 2022   Referral source: Lajune Dubin, MD : 1965   Medical/Treatment Diagnosis: Cervical radicular pain [M54.12]  Payor: BLUE CROSS / Plan: Missy Arceo [id number] PPO / Product Type: PPO /  Onset Date:2022     Prior Hospitalization: see medical history Provider#: [id number]   Medications: Verified on Patient Summary List    Comorbidities: independent with ADLs  Prior Level of Function:unable to comb hair with left arm or hold phone with left arm  Visits from Start of Care: 1    Missed Visits: 3  Reporting Period : 2022 to 2022      Summary of Care:    Short Term Goals: To be accomplished in 1 week  1: Pt to be compliant with initial HEP to improve ease of performing household chores and duties  Status at last note/certification: Established and reviewed with Pt  Discharge: Unable to reassess, pt request D/C after missing 3 appointments. Long Term Goals: To be accomplished in 10 treatments  1: Pt to report < 5/10 pain at worst to increase ease with ADLs. Status at last note/certification: 66/48 pain at worst  Discharge: Unable to reassess, pt request D/C after missing 3 appointments. 2: Pt to report at least 75 % improvement in overall symptoms to improve quality of life and ease of performing household chores. Status at last note/certification: N/A  Discharge: Unable to reassess, pt request D/C after missing 3 appointments. 3: Pt to demonstrate 4/5 gross UE MMT to increase ease of maintaining scapular stability and movement. Status at last note/certification: Lower Trap: 3/5 bilateral              Rhomboids:3/5 bilateral              Middle Trap: 3/5 bilateral  Discharge: Unable to reassess, pt request D/C after missing 3 appointments.   4: Pt to report FOTO score of at least 53 pts to demonstrate improved function and quality of life. Status at last note/certification: FOTO 32 pts  Discharge: Unable to reassess, pt request D/C after missing 3 appointments. ASSESSMENT/RECOMMENDATIONS:  Initial Evaluation on 9/8/2022. Scheduled 2 F/U appointments. R/S 1 and later no showed 2. Attempted to contact pt via home phone regarding no show, no answer and voicemail box full.  Pt later called back requesting D/C from PT.  [x]Discontinue therapy: []Patient has reached or is progressing toward set goals      [x]Patient is non-compliant or has abdicated      []Due to lack of appreciable progress towards set goals    Libby Carter, PTA 9/20/2022 5:31 PM

## 2022-09-25 ENCOUNTER — HOSPITAL ENCOUNTER (OUTPATIENT)
Age: 57
Discharge: HOME | End: 2022-09-25
Attending: PHYSICAL MEDICINE & REHABILITATION
Payer: COMMERCIAL

## 2022-09-25 PROCEDURE — 72141 MRI NECK SPINE W/O DYE: CPT

## 2022-10-19 ENCOUNTER — OFFICE VISIT (OUTPATIENT)
Dept: ORTHOPEDIC SURGERY | Age: 57
End: 2022-10-19
Payer: COMMERCIAL

## 2022-10-19 VITALS
BODY MASS INDEX: 28.34 KG/M2 | TEMPERATURE: 97.4 F | HEART RATE: 84 BPM | OXYGEN SATURATION: 99 % | WEIGHT: 154 LBS | HEIGHT: 62 IN

## 2022-10-19 DIAGNOSIS — M54.10 BRACHIAL NEURITIS OF LEFT UPPER EXTREMITY: ICD-10-CM

## 2022-10-19 DIAGNOSIS — M50.20 PROTRUSION OF CERVICAL INTERVERTEBRAL DISC: ICD-10-CM

## 2022-10-19 DIAGNOSIS — M47.816 LUMBAR SPONDYLOSIS: Primary | ICD-10-CM

## 2022-10-19 DIAGNOSIS — M70.62 TROCHANTERIC BURSITIS, LEFT HIP: ICD-10-CM

## 2022-10-19 DIAGNOSIS — M89.9 LESION OF BONE OF THORACIC SPINE: ICD-10-CM

## 2022-10-19 PROCEDURE — 99214 OFFICE O/P EST MOD 30 MIN: CPT | Performed by: PHYSICAL MEDICINE & REHABILITATION

## 2022-10-19 RX ORDER — SEMAGLUTIDE 1.34 MG/ML
INJECTION, SOLUTION SUBCUTANEOUS
COMMUNITY
Start: 2022-09-23

## 2022-10-19 RX ORDER — ERGOCALCIFEROL 1.25 MG/1
CAPSULE ORAL
COMMUNITY
Start: 2022-09-21

## 2022-10-19 RX ORDER — PREGABALIN 50 MG/1
CAPSULE ORAL
Qty: 60 CAPSULE | Refills: 2 | Status: SHIPPED | OUTPATIENT
Start: 2022-10-19

## 2022-10-19 RX ORDER — ATORVASTATIN CALCIUM 20 MG/1
TABLET, FILM COATED ORAL
COMMUNITY
Start: 2022-09-21

## 2022-10-19 NOTE — PROGRESS NOTES
[address], [address]  [place]  Phone: [phone]  Fax: [phone]        Esha Vance  : 1965  PCP: Alex Paige MD    PROGRESS NOTE      ASSESSMENT AND PLAN    Diagnoses and all orders for this visit:    1. Lumbar spondylosis  -     pregabalin (Lyrica) 50 mg capsule; One-two po qpm    2. Trochanteric bursitis, left hip  -     pregabalin (Lyrica) 50 mg capsule; One-two po qpm    3. Lesion of bone of thoracic spine    4. Protrusion of cervical intervertebral disc    5. Brachial neuritis of left upper extremity  -     pregabalin (Lyrica) 50 mg capsule; One-two po qpm      Javier Duarte is a 65 y.o. female with long COVID, thankfully her left upper extremity radicular pain has improved significantly with physical therapy and Lyrica. Incidental finding on cervical MRI was concerning for atypical hemangioma, she is due to have her 3-month follow-up MRI with and without contrast in December. Her main symptom today is that of axial low back pain. Hip tightness noted bilaterally on exam today. RF Lyrica. Patient may take 1-2 po QHS. Given instructions on hip flexor exercises. Perform as tolerated. Advised to continue HEP as tolerated. Follow-up and Dispositions    Return in about 3 months (around 2023) for medication management. HISTORY OF PRESENT ILLNESS      Javier Duarte is a 65 y.o. female presents for follow up of back pain. LV trial of Lyrica 50 mg BID. C MRI reviewed with patient. Atypical thoracic hemangioma noted. Reports that her daughter had a similar finding earlier today. Her neck pain is manageable. She states the numbness and tingling LUE has improved. She is now doing home exercise. Pt also reports low back and B/L hip pain. Back > neck pain. Her hip pain is increased with ROM. She has trouble getting out of the tub with her left hip. Denies sciatica.     Pt takes Lyrica 50 mg QHS PRN. She is unable to tolerate during the day. She notes benefit with PT for her neck pain. She is compliant with her HEP. Going to [facility] to surprise her granddaughter for her birthday around Thanksgiving. Pain Assessment  10/19/2022   Location of Pain Neck;Back   Location Modifiers -   Severity of Pain 2   Quality of Pain Other (Comment)   Quality of Pain Comment like something has cut me accross the back   Duration of Pain Persistent   Frequency of Pain Intermittent   Aggravating Factors Other (Comment)   Aggravating Factors Comment the pain just happens. Limiting Behavior Yes   Relieving Factors Other (Comment)   Relieving Factors Comment deep breathing   Result of Injury No         Investigations:   C MRI 9/2022: disc protrusion C5-6, T5 lesion recommend follow up 3-6 months (scheduled for 12/2022)    L XR AP/lat/flex/ext 4V 9/7/2022 (I personally reviewed these images): early degenerative changes L4 to sacrum, no instability  L MRI 2015: small disc protrusion left L4-5, mild stenosis L5-S1  Spine surgery consult: unknown     Treatments:  Physical therapy: 9/2022 for neck - benefit, 2/2022 for neck, shoulder, and low back, 10/2020  Spinal injections: no  Spinal surgery- no  Beneficial medications: Lyrica QHS - unable to tolerate during the day  Failed medications: Voltaren, Baclofen, Prednisone     Work Status: employed at [employer]  Pertinent PMHx:  HTN, gastric ulcer, COVID 7/2022, long COVID symptoms including loss of taste, left upper extremity paresthesias (resolved).     PHYSICAL EXAMINATION    Visit Vitals  Pulse 84   Temp 97.4 °F (36.3 °C) (Temporal)   Ht 5' 2\" (1.575 m)   Wt 154 lb (69.9 kg)   SpO2 99%   BMI 28.17 kg/m²     UE strength intact  TTP midline mid thoracic, left L3-4  Tender left trochanteric bursa  Positive ASYA maneuver L > R  LE strength intact  SLR negative  LUE strength intact  Negative Lilly's               Written by Bryanna Bearden, as dictated by Pio Lynch MD.

## 2022-10-19 NOTE — PROGRESS NOTES
Renee Lynn presents today for   Chief Complaint   Patient presents with    Neck Pain    Back Pain       Is someone accompanying this pt? no    Is the patient using any DME equipment during OV? no    Depression Screening:  3 most recent PHQ Screens 10/29/2020   Little interest or pleasure in doing things Not at all   Feeling down, depressed, irritable, or hopeless Not at all   Total Score PHQ 2 0         Coordination of Care:  1. Have you been to the ER, urgent care clinic since your last visit? no  Hospitalized since your last visit? no    2. Have you seen or consulted any other health care providers outside of the [address] [place] [address] since your last visit? Yes, some physical therapy. Include any pap smears or colon screening.  no

## 2022-10-19 NOTE — PATIENT INSTRUCTIONS
Hip Bursitis: Exercises  Introduction  Here are some examples of exercises for you to try. The exercises may be suggested for a condition or for rehabilitation. Start each exercise slowly. Ease off the exercises if you start to have pain. You will be told when to start these exercises and which ones will work best for you. How to do the exercises  Hip rotator stretch    Lie on your back with both knees bent and your feet flat on the floor. Put the ankle of your affected leg on your opposite thigh near your knee. Use your hand to gently push your knee away from your body until you feel a gentle stretch around your hip. Hold the stretch for 15 to 30 seconds. Repeat 2 to 4 times. Repeat steps 1 through 5, but this time use your hand to gently pull your knee toward your opposite shoulder. Iliotibial band stretch    Lean sideways against a wall. If you are not steady on your feet, hold on to a chair or counter. Stand on the leg with the affected hip, with that leg close to the wall. Then cross your other leg in front of it. Let your affected hip drop out to the side of your body and against wall. Then lean away from your affected hip until you feel a stretch. Hold the stretch for 15 to 30 seconds. Repeat 2 to 4 times. Straight-leg raises to the outside    Lie on your side, with your affected hip on top. Tighten the front thigh muscles of your top leg to keep your knee straight. Keep your hip and your leg straight in line with the rest of your body, and keep your knee pointing forward. Do not drop your hip back. Lift your top leg straight up toward the ceiling, about 12 inches off the floor. Hold for about 6 seconds, then slowly lower your leg. Repeat 8 to 12 times. Clamshell    Lie on your side, with your affected hip on top and your head propped on a pillow. Keep your feet and knees together and your knees bent. Raise your top knee, but keep your feet together. Do not let your hips roll back. Your legs should open up like a clamshell. Hold for 6 seconds. Slowly lower your knee back down. Rest for 10 seconds. Repeat 8 to 12 times. Follow-up care is a key part of your treatment and safety. Be sure to make and go to all appointments, and call your doctor if you are having problems. It's also a good idea to know your test results and keep a list of the medicines you take. Where can you learn more? Go to http://www.nguyen.com/  Enter H674 in the search box to learn more about \"Hip Bursitis: Exercises. \"  Current as of: July 1, 2021               Content Version: 13.2  © 2006-2022 Healthwise, [employer]. Care instructions adapted under license by [employer] (which disclaims liability or warranty for this information). If you have questions about a medical condition or this instruction, always ask your healthcare professional. [address] any warranty or liability for your use of this information.

## 2022-10-19 NOTE — LETTER
10/19/2022    Patient: Clark Spears   YOB: 1965   Date of Visit: 10/19/2022     Camryn Azul MD  [address] [place] [address]  Via Fax: [phone]    Dear Camryn Azul MD,      Thank you for referring Ms. Rosanna Wellington to [address] [place] [address] for evaluation. My notes for this consultation are attached. If you have questions, please do not hesitate to call me. I look forward to following your patient along with you.       Sincerely,    Greg Reeves MD

## 2022-12-28 DIAGNOSIS — M89.9 LESION OF BONE OF THORACIC SPINE: ICD-10-CM

## 2023-01-18 ENCOUNTER — TELEPHONE (OUTPATIENT)
Dept: ORTHOPEDIC SURGERY | Age: 58
End: 2023-01-18

## 2023-01-18 NOTE — TELEPHONE ENCOUNTER
Patient called in stating that she had a mri scheduled and had to cancel it due to sickness and would like to schedule to have her mri done. Patient stated that she would possibly need a new order put in for the mri.     Patient can be reached at   [phone]

## 2023-01-19 NOTE — TELEPHONE ENCOUNTER
Ms. Mary Fitch would like to attend [facility]. Records faxed as requested. See bookmark and media.

## 2024-04-17 ENCOUNTER — OFFICE VISIT (OUTPATIENT)
Age: 59
End: 2024-04-17
Payer: COMMERCIAL

## 2024-04-17 VITALS
HEART RATE: 88 BPM | OXYGEN SATURATION: 98 % | HEIGHT: 62 IN | TEMPERATURE: 97.1 F | WEIGHT: 158 LBS | BODY MASS INDEX: 29.08 KG/M2

## 2024-04-17 DIAGNOSIS — M50.30 DDD (DEGENERATIVE DISC DISEASE), CERVICAL: ICD-10-CM

## 2024-04-17 DIAGNOSIS — S16.1XXA STRAIN OF NECK MUSCLE, INITIAL ENCOUNTER: ICD-10-CM

## 2024-04-17 DIAGNOSIS — M47.816 LUMBAR SPONDYLOSIS: ICD-10-CM

## 2024-04-17 DIAGNOSIS — M89.9 LESION OF BONE OF THORACIC SPINE: ICD-10-CM

## 2024-04-17 DIAGNOSIS — M70.61 GREATER TROCHANTERIC BURSITIS OF RIGHT HIP: ICD-10-CM

## 2024-04-17 DIAGNOSIS — S39.012A STRAIN OF LUMBAR REGION, INITIAL ENCOUNTER: Primary | ICD-10-CM

## 2024-04-17 PROCEDURE — 99214 OFFICE O/P EST MOD 30 MIN: CPT | Performed by: PHYSICAL MEDICINE & REHABILITATION

## 2024-04-17 RX ORDER — ONDANSETRON 4 MG/1
4 TABLET, ORALLY DISINTEGRATING ORAL EVERY 8 HOURS PRN
COMMUNITY
Start: 2023-07-03

## 2024-04-17 RX ORDER — BUTALBITAL, ACETAMINOPHEN AND CAFFEINE 50; 325; 40 MG/1; MG/1; MG/1
1 TABLET ORAL 4 TIMES DAILY PRN
COMMUNITY
Start: 2023-07-03

## 2024-04-17 RX ORDER — FEXOFENADINE HCL 180 MG/1
180 TABLET ORAL DAILY PRN
COMMUNITY
Start: 2019-02-14

## 2024-04-17 RX ORDER — HYDROXYZINE HYDROCHLORIDE 25 MG/1
25 TABLET, FILM COATED ORAL EVERY 4 HOURS PRN
COMMUNITY
Start: 2020-03-25

## 2024-04-17 RX ORDER — CYCLOBENZAPRINE HCL 5 MG
5 TABLET ORAL 2 TIMES DAILY PRN
COMMUNITY
Start: 2024-01-26 | End: 2024-04-17

## 2024-04-17 RX ORDER — TRIAMTERENE AND HYDROCHLOROTHIAZIDE 37.5; 25 MG/1; MG/1
1 TABLET ORAL DAILY
COMMUNITY
Start: 2024-02-02

## 2024-04-17 RX ORDER — SPIRONOLACTONE 25 MG/1
1 TABLET ORAL DAILY
COMMUNITY

## 2024-04-17 RX ORDER — LIDOCAINE 50 MG/G
PATCH TOPICAL
COMMUNITY
Start: 2024-01-26

## 2024-04-17 RX ORDER — METHOCARBAMOL 500 MG/1
500 TABLET, FILM COATED ORAL 3 TIMES DAILY PRN
COMMUNITY
Start: 2024-02-02

## 2024-04-17 RX ORDER — IBUPROFEN 800 MG/1
800 TABLET ORAL EVERY 6 HOURS PRN
COMMUNITY
Start: 2024-01-26
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
## 2024-04-29 ENCOUNTER — TELEPHONE (OUTPATIENT)
Age: 59
End: 2024-04-29

## 2024-04-29 ENCOUNTER — HOSPITAL ENCOUNTER (OUTPATIENT)
Facility: HOSPITAL | Age: 59
Discharge: HOME | End: 2024-05-02
Attending: PHYSICAL MEDICINE & REHABILITATION
Payer: COMMERCIAL

## 2024-04-29 DIAGNOSIS — M47.816 LUMBAR SPONDYLOSIS: ICD-10-CM

## 2024-04-29 DIAGNOSIS — S39.012A STRAIN OF LUMBAR REGION, INITIAL ENCOUNTER: ICD-10-CM

## 2024-04-29 DIAGNOSIS — M89.9 LESION OF BONE OF THORACIC SPINE: ICD-10-CM

## 2024-04-29 LAB — CREAT UR-MCNC: 1 MG/DL (ref 0.6–1.3)

## 2024-04-29 PROCEDURE — A9577 INJ MULTIHANCE: HCPCS | Performed by: PHYSICAL MEDICINE & REHABILITATION

## 2024-04-29 PROCEDURE — 6360000004 HC RX CONTRAST MEDICATION: Performed by: PHYSICAL MEDICINE & REHABILITATION

## 2024-04-29 PROCEDURE — 72158 MRI LUMBAR SPINE W/O & W/DYE: CPT

## 2024-04-29 PROCEDURE — 72156 MRI NECK SPINE W/O & W/DYE: CPT

## 2024-04-29 PROCEDURE — 82565 ASSAY OF CREATININE: CPT

## 2024-04-29 RX ADMIN — GADOBENATE DIMEGLUMINE 14 ML: 529 INJECTION, SOLUTION INTRAVENOUS at 14:32

## 2024-04-29 NOTE — TELEPHONE ENCOUNTER
I called and spoke to Roz at the [facility]. She states that the tech over the weekend had some concerns about which test needed to be done for the pt. The concern was noted for a lesion at T5. The pt has orders for a cervical and lumbar MRI. They were wondering if this just needed to be a thoracic study. I reviewed Dr. Tinsley's note. She mentioned that she is hesitant to order a dedicated thoracic MRI at this time. She also mentioned that her suspicions were low regarding the lesion in question. I informed the tech of this and she verbalized understanding. She states that they will reach back out to the pt to get her scheduled for these. They may be able to get her in as early as this afternoon. I called and spoke to MsNina Neville. She was notified of this and verbalized understanding. I also provided the pt with the number to the scheduling dept to call them if she does not hear from them by tomorrow. No questions or concerns voiced at this time.

## 2024-04-29 NOTE — TELEPHONE ENCOUNTER
Patient called and said that she went to get her Mri of the Lumbar and Cervical Spine done this past Saturday 4/27/24, but the radiologist asked her if their was a history of Cancer in her family.     Patient said that it through her off guard. That she has Lesion on her back , and that the Radiologist told her that the mri without Contrast will not show the lesion. That she never had the mri done.    Patient said that [facility] Radiologist told her that they were calling  sometime today, and that once they spoke with Dr. Tinsley, they were going to do her Mri.    Patient is asking for a call back with some guidance.     Patient tel. [phone].

## 2024-05-01 ENCOUNTER — OFFICE VISIT (OUTPATIENT)
Age: 59
End: 2024-05-01
Payer: COMMERCIAL

## 2024-05-01 VITALS
HEART RATE: 78 BPM | HEIGHT: 62 IN | BODY MASS INDEX: 28.23 KG/M2 | OXYGEN SATURATION: 97 % | TEMPERATURE: 97.8 F | WEIGHT: 153.4 LBS

## 2024-05-01 DIAGNOSIS — M51.26 PROTRUSION OF LUMBAR INTERVERTEBRAL DISC: Primary | ICD-10-CM

## 2024-05-01 DIAGNOSIS — M54.31 RIGHT SIDED SCIATICA: ICD-10-CM

## 2024-05-01 DIAGNOSIS — M47.816 LUMBAR FACET ARTHROPATHY: ICD-10-CM

## 2024-05-01 DIAGNOSIS — M48.02 CERVICAL STENOSIS OF SPINE: ICD-10-CM

## 2024-05-01 DIAGNOSIS — M89.9 LESION OF BONE OF THORACIC SPINE: ICD-10-CM

## 2024-05-01 PROCEDURE — 99214 OFFICE O/P EST MOD 30 MIN: CPT | Performed by: PHYSICAL MEDICINE & REHABILITATION

## 2024-05-01 RX ORDER — PREGABALIN 25 MG/1
CAPSULE ORAL
Qty: 60 CAPSULE | Refills: 2 | Status: SHIPPED | OUTPATIENT
Start: 2024-05-01 | End: 2024-08-01

## 2024-05-01 RX ORDER — METHOCARBAMOL 500 MG/1
500 TABLET, FILM COATED ORAL NIGHTLY PRN
Qty: 30 TABLET | Refills: 2 | Status: SHIPPED | OUTPATIENT
Start: 2024-05-01
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
## 2024-05-15 ENCOUNTER — TELEPHONE (OUTPATIENT)
Age: 59
End: 2024-05-15

## 2024-05-15 DIAGNOSIS — M51.26 PROTRUSION OF LUMBAR INTERVERTEBRAL DISC: Primary | ICD-10-CM

## 2024-05-15 DIAGNOSIS — M48.02 CERVICAL STENOSIS OF SPINE: ICD-10-CM

## 2024-05-15 NOTE — TELEPHONE ENCOUNTER
Patient states her neck and back pain has worsened, and that physical therapy didn't work for her. Patient also reports having severe headaches, nausea, and asking what else she can do to help with the pain. Next available appt was offered, and declined. Patient is asking for a call back, and can be reached at [phone].

## 2024-05-15 NOTE — TELEPHONE ENCOUNTER
Needs to give the lyrica some time to work which can take 4-6 weeks.   Can call for cancellations.

## 2024-05-16 NOTE — TELEPHONE ENCOUNTER
Patient evaluated about 2 weeks ago.  She has moderate stenosis C5-C6 and disc protrusions left L4-L5 and right L5 and S1.    She was started on a very low-dose of pregabalin, 25 mg ramping to twice daily.    If she started to have worsening of her headaches and nausea after starting this medication, would discontinue.  She may continue with her muscle relaxer, methocarbamol as needed.    Last visit, nerve root blocks were discussed if her symptoms are not improving.  If she is interested in discussing injections further, needs a follow-up appointment either with me or with LORENE Valente.    I can order aquaPT.

## 2024-05-16 NOTE — TELEPHONE ENCOUNTER
I called and spoke to the pt. The pt was identified using 2 pt identifiers. She was notified of Dr. Tinsley's response to her recent message. The pt states that she does not remember talking with Dr. Tinsley about the SNRB injections but she would like to set up a telephone visit to discuss this. She accepted a visit on 05/21/24 at 1500 to discuss this and possible get it ordered. She would also like to get the referral for aqua therapy sent to the [facility] for aqua therapy. The message will be routed back to the provider for review. The physical therapy referral has been pended.

## 2024-05-16 NOTE — TELEPHONE ENCOUNTER
I called and spoke to the pt regarding her message. She states that she is not getting any relief with her pain. She is taking the lyrica three times a day. She reports waking up every morning with dizziness, nausea and feeling like she has been hit in the head. The back pain is also not improving. The pt states that she does not do well with pills and is wondering if there is anything else she can be doing. Ms. Neville states that she did physical therapy but it made some things worse. She is wondering if she should try aqua therapy. The message will be routed to Dr. Tinsley for review.

## 2024-05-21 ENCOUNTER — TELEMEDICINE (OUTPATIENT)
Age: 59
End: 2024-05-21
Payer: COMMERCIAL

## 2024-05-21 DIAGNOSIS — M54.31 RIGHT SIDED SCIATICA: ICD-10-CM

## 2024-05-21 DIAGNOSIS — M51.26 PROTRUSION OF LUMBAR INTERVERTEBRAL DISC: Primary | ICD-10-CM

## 2024-05-21 PROCEDURE — 99443 PR PHYS/QHP TELEPHONE EVALUATION 21-30 MIN: CPT | Performed by: NURSE PRACTITIONER

## 2024-05-21 RX ORDER — SEMAGLUTIDE 0.68 MG/ML
INJECTION, SOLUTION SUBCUTANEOUS
COMMUNITY
Start: 2024-05-08

## 2024-05-21 NOTE — H&P (VIEW-ONLY)
Olimpia Neville is a 58 y.o. female evaluated via telephone on 5/21/2024 for No chief complaint on file.  .      History of Present Illness: The patient is a 58-year-old female who has back pain that radiates into her right lower extremity down to her ankle.  Last visit she was started on Lyrica and she states she is tolerating it okay.  She also gets some right-sided neck pain and shoulder pain but states the back and leg pain is far worse.  She would like to discuss getting a block for her pain.  Her MRI does show a right L5-S1 disc protrusion that contacts the S1 nerve root.  She denies fever bowel bladder dysfunction.    Physical Exam: The patient is a 58-year-old female who was alert and oriented.  Spoke with fluency.  She did not appear to be in distress.      Assessment/Plan: This is a patient who has back pain with radiating right lower extremity pain.  I will set her up for a right L5, S1 selective nerve root block.  We will see her back after the block.    Diagnoses and all orders for this visit:    Protrusion of lumbar intervertebral disc  -     Ambulatory Referral to Ortho Injection    Right sided sciatica  -     Ambulatory Referral to Ortho Injection          Documentation:  I communicated with the patient and/or health care decision maker about back and leg pain.   Details of this discussion including any medical advice provided: Yes    Total Time: minutes: 21-30 minutes    Olimpia Neville was evaluated through a synchronous (real-time) audio encounter. Patient identification was verified at the start of the visit. She (or guardian if applicable) is aware that this is a billable service, which includes applicable co-pays. This visit was conducted with the patient's (and/or legal guardian's) verbal consent. She has not had a related appointment within my department in the past 7 days or scheduled within the next 24 hours.   The patient was located at Home: [address]  [facility] [address].  The

## 2024-05-21 NOTE — PROGRESS NOTES
Olimpia Neville presents today for No chief complaint on file.      Is someone accompanying this pt? no    Is the patient using any DME equipment during OV? no    Depression Screening:       No data to display                Learning Assessment:  Failed to redirect to the Timeline version of the [employer] Smart[employer].    Abuse Screening:       No data to display                Fall Risk  Failed to redirect to the Timeline version of the [employer] SmartLink.    OPIOID RISK TOOL  Failed to redirect to the Timeline version of the [employer] SmartLink.    Coordination of Care:  1. Have you been to the ER, urgent care clinic since your last visit? no  Hospitalized since your last visit? no    2. Have you seen or consulted any other health care providers outside of the [facility] since your last visit? no Include any pap smears or colon screening. no

## 2024-05-21 NOTE — PROGRESS NOTES
Olimpia Neville is a 58 y.o. female evaluated via telephone on 5/21/2024 for No chief complaint on file.  .      History of Present Illness: The patient is a 58-year-old female who has back pain that radiates into her right lower extremity down to her ankle.  Last visit she was started on Lyrica and she states she is tolerating it okay.  She also gets some right-sided neck pain and shoulder pain but states the back and leg pain is far worse.  She would like to discuss getting a block for her pain.  Her MRI does show a right L5-S1 disc protrusion that contacts the S1 nerve root.  She denies fever bowel bladder dysfunction.    Physical Exam: The patient is a 58-year-old female who was alert and oriented.  Spoke with fluency.  She did not appear to be in distress.      Assessment/Plan: This is a patient who has back pain with radiating right lower extremity pain.  I will set her up for a right L5, S1 selective nerve root block.  We will see her back after the block.    Diagnoses and all orders for this visit:    Protrusion of lumbar intervertebral disc  -     Ambulatory Referral to Ortho Injection    Right sided sciatica  -     Ambulatory Referral to Ortho Injection          Documentation:  I communicated with the patient and/or health care decision maker about back and leg pain.   Details of this discussion including any medical advice provided: Yes    Total Time: minutes: 21-30 minutes    Olimpia Neville was evaluated through a synchronous (real-time) audio encounter. Patient identification was verified at the start of the visit. She (or guardian if applicable) is aware that this is a billable service, which includes applicable co-pays. This visit was conducted with the patient's (and/or legal guardian's) verbal consent. She has not had a related appointment within my department in the past 7 days or scheduled within the next 24 hours.   The patient was located at Home: [address]  [facility] [address].  The

## 2024-06-11 ENCOUNTER — HOSPITAL ENCOUNTER (OUTPATIENT)
Facility: HOSPITAL | Age: 59
Discharge: HOME | End: 2024-06-14
Payer: COMMERCIAL

## 2024-06-11 VITALS
TEMPERATURE: 98.3 F | SYSTOLIC BLOOD PRESSURE: 152 MMHG | DIASTOLIC BLOOD PRESSURE: 89 MMHG | HEART RATE: 74 BPM | OXYGEN SATURATION: 100 % | RESPIRATION RATE: 17 BRPM

## 2024-06-11 PROCEDURE — 64484 NJX AA&/STRD TFRM EPI L/S EA: CPT | Performed by: PHYSICAL MEDICINE & REHABILITATION

## 2024-06-11 PROCEDURE — 6360000004 HC RX CONTRAST MEDICATION: Performed by: PHYSICAL MEDICINE & REHABILITATION

## 2024-06-11 PROCEDURE — 2500000003 HC RX 250 WO HCPCS: Performed by: PHYSICAL MEDICINE & REHABILITATION

## 2024-06-11 PROCEDURE — 64484 NJX AA&/STRD TFRM EPI L/S EA: CPT

## 2024-06-11 PROCEDURE — 6360000002 HC RX W HCPCS: Performed by: PHYSICAL MEDICINE & REHABILITATION

## 2024-06-11 PROCEDURE — 6370000000 HC RX 637 (ALT 250 FOR IP): Performed by: PHYSICAL MEDICINE & REHABILITATION

## 2024-06-11 PROCEDURE — 64483 NJX AA&/STRD TFRM EPI L/S 1: CPT | Performed by: PHYSICAL MEDICINE & REHABILITATION

## 2024-06-11 PROCEDURE — 64483 NJX AA&/STRD TFRM EPI L/S 1: CPT

## 2024-06-11 RX ORDER — DIAZEPAM 5 MG/1
5 TABLET ORAL ONCE
Status: COMPLETED | OUTPATIENT
Start: 2024-06-11 | End: 2024-06-11

## 2024-06-11 RX ORDER — IOPAMIDOL 408 MG/ML
4 INJECTION, SOLUTION INTRATHECAL
Status: COMPLETED | OUTPATIENT
Start: 2024-06-11 | End: 2024-06-11

## 2024-06-11 RX ORDER — LIDOCAINE HYDROCHLORIDE 10 MG/ML
30 INJECTION, SOLUTION EPIDURAL; INFILTRATION; INTRACAUDAL; PERINEURAL ONCE
Status: COMPLETED | OUTPATIENT
Start: 2024-06-11 | End: 2024-06-11

## 2024-06-11 RX ORDER — DEXAMETHASONE SODIUM PHOSPHATE 10 MG/ML
10 INJECTION, SOLUTION INTRAMUSCULAR; INTRAVENOUS ONCE
Status: COMPLETED | OUTPATIENT
Start: 2024-06-11 | End: 2024-06-11

## 2024-06-11 RX ORDER — DIAZEPAM 5 MG/1
10 TABLET ORAL ONCE
Status: COMPLETED | OUTPATIENT
Start: 2024-06-11 | End: 2024-06-11

## 2024-06-11 RX ORDER — DIAZEPAM 5 MG/1
2.5 TABLET ORAL ONCE
Status: COMPLETED | OUTPATIENT
Start: 2024-06-11 | End: 2024-06-11

## 2024-06-11 RX ADMIN — IOPAMIDOL 2 ML: 408 INJECTION, SOLUTION INTRATHECAL at 10:18

## 2024-06-11 RX ADMIN — DEXAMETHASONE SODIUM PHOSPHATE 10 MG: 10 INJECTION, SOLUTION INTRAMUSCULAR; INTRAVENOUS at 10:19

## 2024-06-11 RX ADMIN — LIDOCAINE HYDROCHLORIDE 15 ML: 10 INJECTION, SOLUTION EPIDURAL; INFILTRATION; INTRACAUDAL; PERINEURAL at 10:15

## 2024-06-11 RX ADMIN — DIAZEPAM 5 MG: 5 TABLET ORAL at 09:55

## 2024-06-11 ASSESSMENT — PAIN - FUNCTIONAL ASSESSMENT: PAIN_FUNCTIONAL_ASSESSMENT: 0-10

## 2024-06-11 ASSESSMENT — PAIN DESCRIPTION - DESCRIPTORS: DESCRIPTORS: ACHING

## 2024-06-11 ASSESSMENT — PAIN SCALES - GENERAL: PAINLEVEL_OUTOF10: 10

## 2024-06-11 NOTE — PERIOP NOTE
After procedure, patient BP elevated, patient is back to her baseline, see doc flow sheets. Patient discharged via wheel chair to waiting family.

## 2024-06-11 NOTE — PROCEDURES
SELECTIVE NERVE ROOT BLOCK PROCEDURE NOTE      Patient Name: Olimpia Neville  Date of Procedure: June 11, 2024  Preoperative Diagnosis:  Lumbar Radicular Pain  Post Operative Diagnosis:  Lumbar Radicular Pain  Location:  [facility], [place], [place]    Procedure :    right L5 Selective Nerve Root Block  right S1 Selective Nerve Root Block    Consent:  Informed consent was obtained prior to the procedure.  The patient was given the opportunity to ask questions regarding the procedure and its associated risks.  In addition to the potential risks associated with the procedure itself, the patient was informed both verbally and in writing of the potential side effects of the use of glucocorticoid.  The patient appeared to comprehend the informed consent and desired to have the procedure performed.        Procedure:  The patient was placed in the prone position on the fluoroscopy table and the back was prepped and draped in the usual sterile manner.  The exact spinal level was  identified using fluoroscopy, and Lidocaine 1 % was injected locally, a 22 gauge spinal needle was passed to the transverse process.  The depth was noted and the needle redirected to pass inferior and approximately one cm anterior to the transverse process.    Yes  about 1 cc of Isovue M-200 was used to verify positioning in the epidural and paravertebral space and outlined the course of the spinal nerve into the epidural space.  The same procedure was repeated at each spinal level indicated above. No vascular uptake was identified.    A total of 10 mg of preservative free Dexamethasone and 1 cc of Lidocaine/site was slowly injected.       The patient tolerated the procedure well.  The injection area was cleaned and bandaids applied.  Not excessive bleeding was noted.   Patient dressed and discharged to home with instructions.    Discussion: The patient tolerated the procedure well. Patient reported alona-procedural pain on

## 2024-06-11 NOTE — INTERVAL H&P NOTE
Update History & Physical    The patient's History and Physical of May 21, 2024 was reviewed. There was no change. The surgical site was confirmed by the patient and me.     Plan: The risks, benefits, expected outcome, and alternative to the recommended procedure have been discussed with the patient. Patient understands and wants to proceed with the procedure.     Electronically signed by LUIS E CRISOSTOMO MD on 6/11/2024 at 10:06 AM

## 2024-07-10 ENCOUNTER — TELEPHONE (OUTPATIENT)
Age: 59
End: 2024-07-10

## 2024-07-10 NOTE — TELEPHONE ENCOUNTER
Patient called and said that she received a Block injection on 06/11/2024 by .     Patient said she has a follow up appt with  on 7/22/24.    Patient said that she has been in a lot of pain. That she does not know if the injection worked. That due to the pain, she has been close to tears.     Patient is asking if their is anyway  could see her as soon as possible.     Patient tel. [phone].

## 2024-07-10 NOTE — TELEPHONE ENCOUNTER
I called and spoke to the pt. The pt was identified using 2 pt identifiers. She was offered an appt with Dr. Tinsley at 3 pm to discuss the next step in her treatment plan. The pt is aware of the office location.

## 2024-07-11 ENCOUNTER — OFFICE VISIT (OUTPATIENT)
Age: 59
End: 2024-07-11
Payer: COMMERCIAL

## 2024-07-11 VITALS
HEART RATE: 85 BPM | WEIGHT: 154 LBS | TEMPERATURE: 97.6 F | HEIGHT: 62 IN | BODY MASS INDEX: 28.34 KG/M2 | OXYGEN SATURATION: 98 %

## 2024-07-11 DIAGNOSIS — M47.816 LUMBAR FACET ARTHROPATHY: ICD-10-CM

## 2024-07-11 DIAGNOSIS — M54.31 RIGHT SIDED SCIATICA: ICD-10-CM

## 2024-07-11 DIAGNOSIS — M51.26 PROTRUSION OF LUMBAR INTERVERTEBRAL DISC: Primary | ICD-10-CM

## 2024-07-11 PROCEDURE — 99214 OFFICE O/P EST MOD 30 MIN: CPT | Performed by: PHYSICAL MEDICINE & REHABILITATION

## 2024-07-11 RX ORDER — DULOXETIN HYDROCHLORIDE 20 MG/1
20 CAPSULE, DELAYED RELEASE ORAL
Qty: 30 CAPSULE | Refills: 2 | Status: SHIPPED | OUTPATIENT
Start: 2024-07-11

## 2024-07-11 RX ORDER — IBUPROFEN 600 MG/1
600 TABLET ORAL 3 TIMES DAILY PRN
Qty: 90 TABLET | Refills: 2 | Status: SHIPPED | OUTPATIENT
Start: 2024-07-11
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
## 2025-01-07 ENCOUNTER — OFFICE VISIT (OUTPATIENT)
Age: 60
End: 2025-01-07
Payer: COMMERCIAL

## 2025-01-07 VITALS
TEMPERATURE: 98.3 F | BODY MASS INDEX: 29.81 KG/M2 | WEIGHT: 162 LBS | HEART RATE: 84 BPM | HEIGHT: 62 IN | OXYGEN SATURATION: 97 %

## 2025-01-07 DIAGNOSIS — M62.838 MUSCLE SPASM OF RIGHT LEG: ICD-10-CM

## 2025-01-07 DIAGNOSIS — M54.31 RIGHT SIDED SCIATICA: ICD-10-CM

## 2025-01-07 DIAGNOSIS — M47.816 LUMBAR FACET ARTHROPATHY: ICD-10-CM

## 2025-01-07 DIAGNOSIS — M51.26 PROTRUSION OF LUMBAR INTERVERTEBRAL DISC: Primary | ICD-10-CM

## 2025-01-07 PROCEDURE — 99214 OFFICE O/P EST MOD 30 MIN: CPT | Performed by: PHYSICAL MEDICINE & REHABILITATION

## 2025-01-07 RX ORDER — PREDNISOLONE ACETATE 10 MG/ML
1 SUSPENSION/ DROPS OPHTHALMIC 4 TIMES DAILY
COMMUNITY
Start: 2024-12-30

## 2025-01-07 RX ORDER — DULOXETIN HYDROCHLORIDE 20 MG/1
20 CAPSULE, DELAYED RELEASE ORAL
Qty: 30 CAPSULE | Refills: 2 | Status: SHIPPED | OUTPATIENT
Start: 2025-01-07

## 2025-01-07 RX ORDER — PREGABALIN 50 MG/1
50 CAPSULE ORAL
Qty: 30 CAPSULE | Refills: 2 | Status: SHIPPED | OUTPATIENT
Start: 2025-01-07 | End: 2025-04-07

## 2025-01-07 NOTE — PROGRESS NOTES
[facility]    [facility], [address]  [place] [address]  Phone: [phone]  Fax: [phone]        Olimpia Neville  : 1965  PCP: Amber Jimenez MD    PROGRESS NOTE      ASSESSMENT AND PLAN    Olimpia was seen today for lower back pain.    Diagnoses and all orders for this visit:    Protrusion of lumbar intervertebral disc  -     DULoxetine (CYMBALTA) 20 MG extended release capsule; Take 1 capsule by mouth Daily with supper  -     pregabalin (LYRICA) 50 MG capsule; Take 1 capsule by mouth nightly for 90 days. 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  Failed PT, nerve root blocks, difficulty tolerating medication.   EDX right lower extremity  Resume Cymbalta 20 mg q. dinner and Lyrica 50 mg nightly routine    Follow-up and Dispositions    Return for EDx w/DA. Med fu w/me 2 months.         HISTORY OF PRESENT ILLNESS    Olimpia Neville is a 59 y.o. female presents for follow up of neck and back pain radiating down the right leg. At her last OV (24), Trial Cymbalta 20mg QD w/ food. Rf ibuprofen 600mg PRN w/ food.    Patient did not return for follow-up appointment.  She takes duloxetine sometimes.  She still is taking Lyrica sometimes as well.    She comes in today reporting 3 months of worsening cramping in her right leg particularly at nighttime.  Ongoing low back pain radiating right lower extremity L5-S1

## 2025-01-07 NOTE — PROGRESS NOTES
Olimpia Neville presents today for   Chief Complaint   Patient presents with    Lower Back Pain       Is someone accompanying this pt? no    Is the patient using any DME equipment during OV? no    Coordination of Care:  1. Have you been to the ER, urgent care clinic since your last visit? no  Hospitalized since your last visit? no    2. Have you seen or consulted any other health care providers outside of the [facility] since your last visit? Yes, eye doctor Include any pap smears or colon screening. no

## 2025-04-10 ENCOUNTER — OFFICE VISIT (OUTPATIENT)
Age: 60
End: 2025-04-10

## 2025-04-10 VITALS — HEIGHT: 62 IN | WEIGHT: 161 LBS | BODY MASS INDEX: 29.63 KG/M2

## 2025-04-10 DIAGNOSIS — M17.11 PRIMARY OSTEOARTHRITIS OF RIGHT KNEE: Primary | ICD-10-CM

## 2025-04-10 DIAGNOSIS — M22.8X9 PATELLAR MALTRACKING, UNSPECIFIED LATERALITY: ICD-10-CM

## 2025-04-10 RX ORDER — ACETAMINOPHEN 500 MG
1000 TABLET ORAL EVERY 8 HOURS PRN
Qty: 180 TABLET | Refills: 0 | Status: SHIPPED | OUTPATIENT
Start: 2025-04-10 | End: 2025-05-10

## 2025-04-10 RX ORDER — MELOXICAM 15 MG/1
15 TABLET ORAL DAILY
Qty: 30 TABLET | Refills: 2 | Status: SHIPPED | OUTPATIENT
Start: 2025-04-10 | End: 2025-07-09

## 2025-04-10 NOTE — PROGRESS NOTES
name: Not on file    Number of children: Not on file    Years of education: Not on file    Highest education level: Not on file   Occupational History    Not on file   Tobacco Use    Smoking status: Never    Smokeless tobacco: Never   Substance and Sexual Activity    Alcohol use: No    Drug use: No    Sexual activity: Not on file   Other Topics Concern    Not on file   Social History Narrative    Not on file     Social Drivers of Health     Financial Resource Strain: Not on file   Food Insecurity: Not on file   Transportation Needs: Not on file   Physical Activity: Not on file   Stress: Not on file   Social Connections: Not on file   Intimate Partner Violence: Not on file   Housing Stability: Not on file       REVIEW OF SYSTEMS:      Negative except for that stated above.     [unfilled]      Prescription medication management discussed with patient.     Electronically signed by: Hero Chew DO    Note: This note was completed using voice recognition software.  Any typographical/name errors or mistakes are unintentional.     The patient (or guardian, if applicable) and other individuals in attendance with the patient were advised that Artificial Intelligence will be utilized during this visit to record, process the conversation to generate a clinical note, and support improvement of the AI technology. The patient (or guardian, if applicable) and other individuals in attendance at the appointment consented to the use of AI, including the recording.

## 2025-04-17 ENCOUNTER — HOSPITAL ENCOUNTER (OUTPATIENT)
Facility: HOSPITAL | Age: 60
Setting detail: RECURRING SERIES
Discharge: HOME | End: 2025-04-20
Attending: ORTHOPAEDIC SURGERY
Payer: COMMERCIAL

## 2025-04-17 PROCEDURE — 97161 PT EVAL LOW COMPLEX 20 MIN: CPT

## 2025-04-17 NOTE — PROGRESS NOTES
PT DAILY TREATMENT NOTE/Hip/Knee/Ankle EVAL 10-18      Patient Name: Olimpia Neville    Date: 2025    : 1965  Insurance: Pay[place]: [place] [employer] / Plan: [employer] / Product Type: *No Product type* /      Patient  verified yes     Visit #   Current / Total 1 8   Time   In / Out 230 310     TREATMENT AREA =  Primary osteoarthritis of right knee [M17.11]  Patellar maltracking, unspecified laterality [M22.8X9]        SUBJECTIVE  Pain Level (0-10 scale): 7/10  []constant []intermittent []improving [x]worsening []no change since onset    Any medication changes, allergies to medications, adverse drug reactions, diagnosis change, or new procedure performed?: [x] No    [] Yes   Subjective functional status/changes:     PLOF: functionally independent, no AD, active lifestyle (played tennis), work requires frequent trips up and down stairs. Pt unable to stand >10 minutes secondary to leg pain.   Limitations to PLOF:  Minimal pain with stairs, able to play tennis, able to stand/walk >1 hours.   Mechanism of Injury: Pt reports no known SHIRA; stating pain has been getting worse over last year. States a month ago she started to get sharp pain at right knee while walking. Pt reports she additionally has been having significant tightness in bilateral legs that is worse when extended.     Current symptoms/Complaints: 5/10 at best with rest; 10/10 at worst with stairs/walking; pt reports they are unable to sleep through the night secondary to pain  Previous Treatment/Compliance: N/A   PMHx/Surgical Hx: Other: HBP, OA , LBP  Work Hx: School Therapist   Living Situation: Live in 2ST home with   Pt Goals: \"To try and figure out what will keep the pain away\"  Barriers: []pain []financial []time []transportation []other      OBJECTIVE    24 min []Eval - untimed                          Therapeutic Procedures:  Tx Min Billable or 1:1 Min (if diff from Tx Min) Procedure, Rationale, Specifics   7  [id number]
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
## 2025-04-23 ENCOUNTER — HOSPITAL ENCOUNTER (OUTPATIENT)
Facility: HOSPITAL | Age: 60
Setting detail: RECURRING SERIES
End: 2025-04-23
Attending: ORTHOPAEDIC SURGERY
Payer: COMMERCIAL

## 2025-04-23 ENCOUNTER — TELEPHONE (OUTPATIENT)
Facility: HOSPITAL | Age: 60
End: 2025-04-23

## 2025-04-24 ENCOUNTER — HOSPITAL ENCOUNTER (OUTPATIENT)
Facility: HOSPITAL | Age: 60
Setting detail: RECURRING SERIES
Discharge: HOME | End: 2025-04-27
Attending: ORTHOPAEDIC SURGERY
Payer: COMMERCIAL

## 2025-04-24 PROCEDURE — 97112 NEUROMUSCULAR REEDUCATION: CPT

## 2025-04-24 PROCEDURE — 97110 THERAPEUTIC EXERCISES: CPT

## 2025-04-24 NOTE — PROGRESS NOTES
therapeutic procedures (example: do not include dry needle or estim unattended, both untimed codes, in totals to left)  8-22 min = 1 unit; 23-37 min = 2 units; 38-52 min = 3 units; 53-67 min = 4 units; 68-82 min = 5 units   Total Total     TOTAL TREATMENT TIME:        36     Charge Capture    [x]  Patient Education billed concurrently with other procedures   [x] Review HEP    [] Progressed/Changed HEP, detail:    [] Other detail:       Objective Information/Functional Measures/Assessment    Initiated exercises per flow sheet. First F/U visit. Reviewed HEP, pt reports compliance and had no questions. Cueing for exercise mechanics/form. Pt reports LBP, Right HS pain, Right hip and knee pain. Fair TA recruitment after receiving VC's. Pt fully participated in treatment. No change in pain level post-treatment.    Patient will continue to benefit from skilled PT / OT services to modify and progress therapeutic interventions, analyze and address functional mobility deficits, analyze and address ROM deficits, analyze and address strength deficits, analyze and address soft tissue restrictions, and analyze and cue for proper movement patterns to address functional deficits and attain remaining goals.    Progress toward goals / Updated goals:  []  See Progress Note/Recertification    Short Term Goals: To be accomplished in 4 treatments:  Patient will be independent and compliant with HEP to progress toward goals and restore functional mobility.   Eval Status: issued at eval  Current 4/24/2025: Met, pt reports compliance.     Patient will be able to ascend stairs with reciprocal gait.   Eval Status: Unable     Long Term Goals: To be accomplished in 8 treatments:  Patient will score >/= 40/80 points or higher on Lower Extremity Functional Scale (LEFS) to meet MCID and show improvement with functional activities and ADL's.  Scoring:           MCID = 9 points                                              21-40 = moderate

## 2025-04-29 ENCOUNTER — TELEPHONE (OUTPATIENT)
Facility: HOSPITAL | Age: 60
End: 2025-04-29

## 2025-04-29 NOTE — TELEPHONE ENCOUNTER
Returned call from  stating patient is unable to come in on 04.29 due to poison ivy. LVM informing pt that her appt is 8:30a on 04.30.

## 2025-04-30 ENCOUNTER — HOSPITAL ENCOUNTER (OUTPATIENT)
Facility: HOSPITAL | Age: 60
Setting detail: RECURRING SERIES
Discharge: HOME | End: 2025-05-03
Attending: ORTHOPAEDIC SURGERY
Payer: COMMERCIAL

## 2025-04-30 PROCEDURE — 97110 THERAPEUTIC EXERCISES: CPT

## 2025-04-30 PROCEDURE — 97140 MANUAL THERAPY 1/> REGIONS: CPT

## 2025-04-30 PROCEDURE — 97530 THERAPEUTIC ACTIVITIES: CPT

## 2025-04-30 NOTE — PROGRESS NOTES
PHYSICAL / OCCUPATIONAL THERAPY - DAILY TREATMENT NOTE     Patient Name: Olimpia Neville    Date: 2025    : 1965  Insurance: [place]: [place] [employer] / Plan: [employer] / Product Type: *No Product type* /      Patient  verified Yes     Visit #   Current / Total 3 8   Time   In / Out [phone]   Pain   In / Out 3 0   Subjective Functional Status/Changes: Patient came 11 min late for her appointment today , Patient complains of R knee pain/ Stiffness today    Changes to:  Allergies, Med Hx, Sx Hx?   no       TREATMENT AREA =  Primary osteoarthritis of right knee [M17.11]  Patellar maltracking, unspecified laterality [M22.8X9]    If an interpreting service is utilized for treatment of this patient, the contents of this document represent the material reviewed with the patient via the .     OBJECTIVE      Therapeutic Procedures:  Tx Min Billable or 1:1 Min (if diff from Tx Min) Procedure, Rationale, Specifics   25  50929 Therapeutic Exercise (timed):  increase ROM, strength, coordination, balance, and proprioception to improve patient's ability to progress to PLOF and address remaining functional goals. (see flow sheet as applicable)    Details if applicable:       10  25939 Therapeutic Activity (timed):  use of dynamic activities replicating functional movements to increase ROM, strength, coordination, balance, and proprioception in order to improve patient's ability to progress to PLOF and address remaining functional goals.  (see flow sheet as applicable)    Details if applicable:     12  33061 Manual Therapy (timed):  decrease pain, increase ROM, increase tissue extensibility, and decrease trigger points to improve patient's ability to progress to PLOF and address remaining functional goals.  The manual therapy interventions were performed at a separate and distinct time from the therapeutic activities interventions . Details: STM to R Knee, Gentle R knee Patellar Mobilization.Gentle

## 2025-05-02 ENCOUNTER — HOSPITAL ENCOUNTER (OUTPATIENT)
Facility: HOSPITAL | Age: 60
Setting detail: RECURRING SERIES
Discharge: HOME | End: 2025-05-05
Attending: ORTHOPAEDIC SURGERY
Payer: COMMERCIAL

## 2025-05-02 PROCEDURE — 97140 MANUAL THERAPY 1/> REGIONS: CPT

## 2025-05-02 PROCEDURE — 97110 THERAPEUTIC EXERCISES: CPT

## 2025-05-02 PROCEDURE — 97530 THERAPEUTIC ACTIVITIES: CPT

## 2025-05-02 NOTE — PROGRESS NOTES
PHYSICAL / OCCUPATIONAL THERAPY - DAILY TREATMENT NOTE     Patient Name: Olimpia Neville    Date: 2025    : 1965  Insurance: Payor: [place] [employer] / Plan: [employer] / Product Type: *No Product type* /      Patient  verified Yes     Visit #   Current / Total 5 8   Time   In / Out 910 950   Pain   In / Out 4 0   Subjective Functional Status/Changes: Patient complains of R knee pain/ Stiffness today    Changes to:  Allergies, Med Hx, Sx Hx?   no       TREATMENT AREA =  Primary osteoarthritis of right knee [M17.11]  Patellar maltracking, unspecified laterality [M22.8X9]    If an interpreting service is utilized for treatment of this patient, the contents of this document represent the material reviewed with the patient via the .     OBJECTIVE      Therapeutic Procedures:  Tx Min Billable or 1:1 Min (if diff from Tx Min) Procedure, Rationale, Specifics   22  18147 Therapeutic Exercise (timed):  increase ROM, strength, coordination, balance, and proprioception to improve patient's ability to progress to PLOF and address remaining functional goals. (see flow sheet as applicable)    Details if applicable:       8  20652 Therapeutic Activity (timed):  use of dynamic activities replicating functional movements to increase ROM, strength, coordination, balance, and proprioception in order to improve patient's ability to progress to PLOF and address remaining functional goals.  (see flow sheet as applicable)    Details if applicable:     10  69442 Manual Therapy (timed):  decrease pain, increase ROM, increase tissue extensibility, and decrease trigger points to improve patient's ability to progress to PLOF and address remaining functional goals.  The manual therapy interventions were performed at a separate and distinct time from the therapeutic activities interventions . Details: STM/ DTM to R Knee, Gentle R knee Patellar Mobilization.Gentle PROM R knee Flex/ Ext      Details if applicable:

## 2025-05-06 ENCOUNTER — HOSPITAL ENCOUNTER (OUTPATIENT)
Facility: HOSPITAL | Age: 60
Setting detail: RECURRING SERIES
End: 2025-05-06
Attending: ORTHOPAEDIC SURGERY
Payer: COMMERCIAL

## 2025-05-06 ENCOUNTER — TELEPHONE (OUTPATIENT)
Facility: HOSPITAL | Age: 60
End: 2025-05-06

## 2025-05-06 NOTE — TELEPHONE ENCOUNTER
Pt called stating she is having hip pain this morning and therefore unable to attend PT for the knee. She will f/u with MD to see if they can add hip to the referral.

## 2025-05-08 ENCOUNTER — TELEPHONE (OUTPATIENT)
Facility: HOSPITAL | Age: 60
End: 2025-05-08

## 2025-05-13 ENCOUNTER — HOSPITAL ENCOUNTER (OUTPATIENT)
Facility: HOSPITAL | Age: 60
Setting detail: RECURRING SERIES
Discharge: HOME | End: 2025-05-16
Attending: ORTHOPAEDIC SURGERY
Payer: COMMERCIAL

## 2025-05-13 PROCEDURE — 97530 THERAPEUTIC ACTIVITIES: CPT

## 2025-05-13 PROCEDURE — 97110 THERAPEUTIC EXERCISES: CPT

## 2025-05-13 PROCEDURE — 97112 NEUROMUSCULAR REEDUCATION: CPT

## 2025-05-13 NOTE — PROGRESS NOTES
PHYSICAL / OCCUPATIONAL THERAPY - DAILY TREATMENT NOTE     Patient Name: Olimpia Neville    Date: 2025    : 1965  Insurance: Payor: [employer] / Plan: [facility] [employer] [facility] [employer] / Product Type: *No Product type* /      Patient  verified Yes     Visit #   Current / Total 5 8   Time   In / Out 8:33 9:11   Pain   In / Out 0/10 0/10   Subjective Functional Status/Changes: \"The knee is pretty good today.\"   Changes to:  Allergies, Med Hx, Sx Hx?   no       TREATMENT AREA =  Primary osteoarthritis of right knee [M17.11]  Patellar maltracking, unspecified laterality [M22.8X9]    If an interpreting service is utilized for treatment of this patient, the contents of this document represent the material reviewed with the patient via the .     OBJECTIVE        Therapeutic Procedures:  Tx Min Billable or 1:1 Min (if diff from Tx Min) Procedure, Rationale, Specifics   20  46939 Therapeutic Exercise (timed):  increase ROM, strength, coordination, balance, and proprioception to improve patient's ability to progress to PLOF and address remaining functional goals. (see flow sheet as applicable)    Details if applicable:       10  50345 Neuromuscular Re-Education (timed):  improve balance, coordination, kinesthetic sense, posture, core stability and proprioception to improve patient's ability to develop conscious control of individual muscles and awareness of position of extremities in order to progress to PLOF and address remaining functional goals. (see flow sheet as applicable)    Details if applicable:     8  60432 Therapeutic Activity (timed):  use of dynamic activities replicating functional movements to increase ROM, strength, coordination, balance, and proprioception in order to improve patient's ability to progress to PLOF and address remaining functional goals.  (see flow sheet as applicable)     Details if applicable:     38  [facility] Totals Reminder: bill using total billable min of TIMED therapeutic

## 2025-05-15 ENCOUNTER — HOSPITAL ENCOUNTER (OUTPATIENT)
Facility: HOSPITAL | Age: 60
Setting detail: RECURRING SERIES
Discharge: HOME | End: 2025-05-18
Attending: ORTHOPAEDIC SURGERY
Payer: COMMERCIAL

## 2025-05-15 PROCEDURE — 97110 THERAPEUTIC EXERCISES: CPT

## 2025-05-15 NOTE — PROGRESS NOTES
PHYSICAL / OCCUPATIONAL THERAPY - DAILY TREATMENT NOTE     Patient Name: Olimpia Neville    Date: 5/15/2025    : 1965  Insurance: Payor: [employer]INCON / Plan: [facility] [employer] [facility] / Product Type: *No Product type* /      Patient  verified Yes     Visit #   Current / Total 6 8   Time   In / Out 836 910   Pain   In / Out 4 3   Subjective Functional Status/Changes: Pt reports that she was very sore following last visit most notable in hip   Changes to:  Allergies, Med Hx, Sx Hx?   no       TREATMENT AREA =  Primary osteoarthritis of right knee [M17.11]  Patellar maltracking, unspecified laterality [M22.8X9]    If an interpreting service is utilized for treatment of this patient, the contents of this document represent the material reviewed with the patient via the .     OBJECTIVE      Therapeutic Procedures:  Tx Min Billable or 1:1 Min (if diff from Tx Min) Procedure, Rationale, Specifics   28  88391 Therapeutic Exercise (timed):  increase ROM, strength, coordination, balance, and proprioception to improve patient's ability to progress to PLOF and address remaining functional goals. (see flow sheet as applicable)    Details if applicable:       6  56227 Neuromuscular Re-Education (timed):  improve balance, coordination, kinesthetic sense, posture, core stability and proprioception to improve patient's ability to develop conscious control of individual muscles and awareness of position of extremities in order to progress to PLOF and address remaining functional goals. (see flow sheet as applicable)    Details if applicable:     34  [facility] Totals Reminder: bill using total billable min of TIMED therapeutic procedures (example: do not include dry needle or estim unattended, both untimed codes, in totals to left)  8-22 min = 1 unit; 23-37 min = 2 units; 38-52 min = 3 units; 53-67 min = 4 units; 68-82 min = 5 units   Total Total     TOTAL TREATMENT TIME:    34     Charge Capture    [x]  Patient

## 2025-05-23 ENCOUNTER — HOSPITAL ENCOUNTER (OUTPATIENT)
Facility: HOSPITAL | Age: 60
Setting detail: RECURRING SERIES
Discharge: HOME | End: 2025-05-26
Attending: ORTHOPAEDIC SURGERY
Payer: COMMERCIAL

## 2025-05-23 PROCEDURE — 97530 THERAPEUTIC ACTIVITIES: CPT

## 2025-05-23 PROCEDURE — 97112 NEUROMUSCULAR REEDUCATION: CPT

## 2025-05-23 PROCEDURE — 97110 THERAPEUTIC EXERCISES: CPT

## 2025-05-23 NOTE — PROGRESS NOTES
Physical Therapy Discharge Instructions      [facility]  [address]  [place] [address] [phone] fax    Patient: Olimpia Neville  : 1965      Continue Home Exercise Program 2 times per day for 2-3 weeks, then decrease to 2-5 times per week      Continue with    [x] Ice  as needed 2 times per day     [x] Heat           Follow up with MD:     [x] Upon completion of therapy - in regards to LBP      As needed  []    Recommendations:     [x]   Return to activity with home program    []   Return to activity with the following modifications:       []Post Rehab Program    []Join Independent aquatic program     []Return to/join local gym        Additional Comments:            Venkata Maravilla, PT 2025 11:25 AM

## 2025-05-23 NOTE — PROGRESS NOTES
PT DISCHARGE DAILY NOTE AND SUMMARY 3-25    If signature is requested please return to:    [facility]  Fax: [phone]    Date:2025  Patient name: Olimpia Neville Start of Care: 2025   Referral source: Hero Chew,  : 1965               Medical Diagnosis: Primary osteoarthritis of right knee  Patellar maltracking, unspecified laterality Onset Date: 2/15/2025   Treatment Diagnosis: M25.561  RIGHT KNEE PAIN                                      Prior Hospitalization: see medical history Provider#: [id number]      Comorbidities: HBP, OA , LBP      Prior Level of Function:  functionally independent, no AD, active lifestyle (played tennis), work requires frequent trips up and down stairs. Pt unable to stand/walk >1 hour secondary to leg pain.   Insurance: Payor: [facility] / Plan: [facility] [employer] / Product Type: *No Product type* /      Visits from Start of Care: 7 Missed Visits: 3    non-Medicare    Patient  verified yes     Visit #   Current / Total 7 8   Time   In / Out 11:00 1140   Pain   In / Out 3 3   Subjective Functional Status/Changes: The pt reports her right knee is doing better. Her main complaint is back pain currently     TREATMENT AREA =  Primary osteoarthritis of right knee  Patellar maltracking, unspecified laterality  Pain in right knee      OBJECTIVE      Therapeutic Procedures:    Tx Min Billable or 1:1 Min (if diff from Tx Min) Procedure, Rationale, Specifics   20  22381 Therapeutic Exercise (timed):  increase ROM, strength, coordination, balance, and proprioception to improve patient's ability to progress to PLOF and address remaining functional goals. (see flow sheet as applicable)     Details if applicable:       10  88572 Neuromuscular Re-Education (timed):  improve balance, coordination, kinesthetic sense, posture, core stability and proprioception to improve patient's ability to develop conscious control of individual muscles and awareness

## 2025-07-25 ENCOUNTER — OFFICE VISIT (OUTPATIENT)
Age: 60
End: 2025-07-25
Payer: COMMERCIAL

## 2025-07-25 VITALS — BODY MASS INDEX: 29.44 KG/M2 | WEIGHT: 160 LBS | HEIGHT: 62 IN

## 2025-07-25 DIAGNOSIS — M17.11 PRIMARY OSTEOARTHRITIS OF RIGHT KNEE: Primary | ICD-10-CM

## 2025-07-25 DIAGNOSIS — M22.8X9 PATELLAR MALTRACKING, UNSPECIFIED LATERALITY: ICD-10-CM

## 2025-07-25 PROCEDURE — 99213 OFFICE O/P EST LOW 20 MIN: CPT | Performed by: ORTHOPAEDIC SURGERY

## 2025-07-25 NOTE — PROGRESS NOTES
check on the efficacy of the physical therapy.  We did discuss injections and she would like to hold off on these for now.          1/7/2025    11:47 AM   AMB PAIN ASSESSMENT   Location of Pain Back   Severity of Pain 7   Quality of Pain Sharp;Other (Comment)   Duration of Pain Persistent   Frequency of Pain Constant   Aggravating Factors Other (Comment)   Limiting Behavior Yes   Relieving Factors Other (Comment)   Result of Injury No   Work-Related Injury No   Are there other pain locations you wish to document? No     Tobacco Use: Low Risk  (7/25/2025)    Patient History     Smoking Tobacco Use: Never     Smokeless Tobacco Use: Never     Passive Exposure: Not on file         Past Medical History:   Diagnosis Date    Gastric ulcer     approx 2013    Hypertension     Low back pain     Other ill-defined conditions(799.89)     Sleep Apnea       Family History   Problem Relation Age of Onset    Diabetes Brother     COPD Mother     Hypertension Mother     Hypertension Father     Heart Attack Father     Hypertension Brother        Current Outpatient Medications   Medication Sig Dispense Refill    prednisoLONE acetate (PRED FORTE) 1 % ophthalmic suspension Place 1 drop into the right eye 4 times daily      ibuprofen (ADVIL;MOTRIN) 600 MG tablet Take 1 tablet by mouth 3 times daily as needed for Pain Indications: Pain, MAX 3X/DAY. NO NOT EXCEED 2400MG. LOWEST dose for shortest time possible 90 tablet 2    methocarbamol (ROBAXIN) 500 MG tablet Take 1 tablet by mouth nightly as needed (pain) 30 tablet 2    butalbital-acetaminophen-caffeine (FIORICET, ESGIC) -40 MG per tablet Take 1 tablet by mouth 4 times daily as needed for Headaches      hydrOXYzine HCl (ATARAX) 25 MG tablet Take 1 tablet by mouth every 4 hours as needed      ondansetron (ZOFRAN-ODT) 4 MG disintegrating tablet Take 1 tablet by mouth every 8 hours as needed for Nausea or Vomiting      spironolactone (ALDACTONE) 25 MG tablet Take 1 tablet by mouth